# Patient Record
Sex: MALE | Race: WHITE | NOT HISPANIC OR LATINO | Employment: OTHER | URBAN - METROPOLITAN AREA
[De-identification: names, ages, dates, MRNs, and addresses within clinical notes are randomized per-mention and may not be internally consistent; named-entity substitution may affect disease eponyms.]

---

## 2020-07-08 ENCOUNTER — TELEPHONE (OUTPATIENT)
Dept: ENDOCRINOLOGY | Age: 70
End: 2020-07-08

## 2020-07-09 ENCOUNTER — TELEPHONE (OUTPATIENT)
Dept: ENDOCRINOLOGY | Age: 70
End: 2020-07-09

## 2020-07-09 NOTE — TELEPHONE ENCOUNTER
I S/W PT AND GAVE HIM NEW PT ENDO APPT INFO FOR DR STONE. NEW PT APPT CONFIRMATION FAXED TO DR JEFFERSON'S OFFICE [phone].

## 2020-09-29 ENCOUNTER — HOSPITAL ENCOUNTER (OUTPATIENT)
Dept: OTHER | Facility: HOSPITAL | Age: 70
Discharge: HOME | End: 2020-09-29
Attending: FAMILY MEDICINE

## 2020-11-14 ENCOUNTER — HOSPITAL ENCOUNTER (OUTPATIENT)
Dept: PREADMISSION TESTING | Facility: HOSPITAL | Age: 70
Discharge: HOME | End: 2020-11-14
Attending: OPHTHALMOLOGY

## 2020-11-17 LAB — SARS-COV-2 RNA SPEC QL NAA+PROBE: NOT DETECTED

## 2020-11-19 ENCOUNTER — HOSPITAL ENCOUNTER (OUTPATIENT)
Dept: PERIOP | Facility: HOSPITAL | Age: 70
Setting detail: HOSPITAL OUTPATIENT SURGERY
Discharge: HOME | End: 2020-11-19
Attending: OPHTHALMOLOGY

## 2020-11-19 LAB — GLUCOSE BLD-MCNC: 151 MG/DL (ref 70–99)

## 2020-12-03 ENCOUNTER — HOSPITAL ENCOUNTER (OUTPATIENT)
Dept: PERIOP | Facility: HOSPITAL | Age: 70
Setting detail: HOSPITAL OUTPATIENT SURGERY
Discharge: HOME | End: 2020-12-03
Attending: OPHTHALMOLOGY

## 2020-12-03 LAB — GLUCOSE BLD-MCNC: 169 MG/DL (ref 70–99)

## 2021-04-14 ENCOUNTER — OFFICE VISIT CONVERTED (OUTPATIENT)
Dept: CARDIOLOGY | Facility: CLINIC | Age: 71
End: 2021-04-14
Attending: INTERNAL MEDICINE

## 2021-05-10 ENCOUNTER — HOSPITAL ENCOUNTER (OUTPATIENT)
Dept: NUCLEAR MEDICINE | Facility: HOSPITAL | Age: 71
Discharge: HOME | End: 2021-05-10
Attending: NURSE PRACTITIONER

## 2021-05-11 NOTE — H&P
History and Physical      Patient Name: Josue Beck   Patient ID: [id number]   Sex: Male   YOB: 1950    Referring Provider: Nga Gramajo MD    Visit Date: April 14, 2021    Provider: Javier Brito MD   Location: [facility]   Location Address: [address] [place] [address], [address]   HARSHAD Lee  [id number]   Location Phone: [phone]          Chief Complaint     Evaluate after an abnormal EKG.       History Of Present Illness  Consult requested by: Nga Gramajo MD   Josue Beck is a 71 year old /White male who was getting ready to have an eye procedure and they did an EKG, which appeared abnormal, so he went to his doctor for evaluation. They did a repeat EKG and now he comes in for cardiac consult. He denies any chest pain or pressure. No palpitations, shortness of breath, or swelling. He admits his diabetes is uncontrolled and he was given Jardiance and Farxiga and he said he was urinating quite a bit and he passed out, so he is hesitant to do any other kinds of medications for diabetes but he has not had any other episodes since. He denies any PND or orthopnea. He has had both of his COVID vaccines.   PAST MEDICAL HISTORY: Positive for hypertension, he says his blood pressure is starting to creep up; skin cancer; diabetes. Hospitalized for pneumonia in 1997.   FAMILY HISTORY: Positive for diabetes. Negative for high blood pressure and heart disease.   CURRENT MEDICATIONS: Lisinopril 5 mg daily; atorvastatin 40 mg daily; fenofibrate 160 mg daily; metformin 500 mg t.i.d.; esmoprazole 50 mg daily; aspirin 81 mg daily; fexofenadine 180 mg daily.   CHOLESTEROL STATUS: Labs taken recently on atorvastatin in March show total cholesterol is 181, triglycerides 134, HDL 40, .   PSYCHOSOCIAL HISTORY: Denies any mood change or depression. He is . He drinks caffeine daily. Denies alcohol use. Previous use of tobacco, smoked a pack a day for 10 years but  quit 45 years ago. He walks 3 miles most days and has not had any change in his functional capacity.   ALLERGIES: No known drug allergies.       Review of Systems  · Constitutional  o Admits  o : good general health lately  o Denies  o : fatigue, recent weight changes   · Eyes  o Denies  o : double vision  · HENT  o Admits  o : hearing loss or ringing  o Denies  o : chronic sinus problem, swollen glands in neck  · Cardiovascular  o Denies  o : chest pain, palpitations (fast, fluttering, or skipping beats), swelling (feet, ankles, hands), shortness of breath while walking or lying flat  · Respiratory  o Denies  o : shortness of breath, asthma or wheezing, COPD  · Gastrointestinal  o Denies  o : ulcers, nausea or vomiting  · Neurologic  o Denies  o : lightheaded or dizzy, stroke, headaches  · Musculoskeletal  o Denies  o : joint pain, back pain  · Endocrine  o Admits  o : diabetes  o Denies  o : thyroid disease, heat or cold intolerance, excessive thirst or urination  · Heme-Lymph  o Denies  o : bleeding or bruising tendency, anemia      Vitals  Date Time BP Position Site L\R Cuff Size HR RR TEMP (F) WT  HT  BMI kg/m2 BSA m2 O2 Sat FR L/min FiO2 HC       04/14/2021 08:21 /72 Sitting    69 - R   219lbs 0oz 6'   29.7 2.25             Physical Examination  · Constitutional  o Appearance  o : Awake, alert, in no acute distress, accompanied by his wife.   · Eyes  o Conjunctivae  o : Conjunctivae normal.  o Pupils and Irises  o : Grade 1 funduscoipic exam.  · Ears, Nose, Mouth and Throat  o Oral Cavity  o :   § Oral Mucosa  § : Normal oral mucosa.  · Neck  o Inspection/Palpation/Auscultation  o : No lymphadenopathy. No JVD. No bruit. Good carotid upstroke.  · Respiratory  o Respiratory  o : Clear to percussion and auscultation. Good respiratory effort.  · Cardiovascular  o Heart  o : PMI is displaced. S1, S2 regular. No S3. No S4. A very faint murmur at the apex. Negative diastolic murmurs.   o Peripheral Vascular  System  o :   § Femoral Arteries  § : Good femoral pulses.  § Pedal Pulses  § : Good pedal pulses. No pedal edema.  · Gastrointestinal  o Abdominal Examination  o : Soft with some central obesity, no masses or tenderness, no hepatosplenomegaly. Abdominal aorta not palpable.  · Musculoskeletal  o General  o : Muscle strength is normal with normal tone.  · Skin and Subcutaneous Tissue  o General Inspection  o : Within normal limits.  o Digits and Nails  o : Nails are normal.  · EKG  o EKG  o : EKG from PCP on 03/01/2021 indicates sinus rhythm at rate of 64 with right bundle branch block and LAFB, cannot rule out a previous MI, age indeterminate, with no previous EKGs for comparison.   · Labs  o Labs  o : Labs in March show total cholesterol is 181, triglycerides 134, HDL 40, . Hemoglobin A1c was 8.7.           Assessment     1.  Abnormal EKG.  2.  Hypertension, needs tighter control.   3.  Hyperlipidemia, needs tighter control.   4.  Diabetes mellitus, poorly controlled.          Plan     1.  Increase lisinopril to 10 mg once a day for tighter control of the lipids.   2.  Stop atorvastatin.  3.  Start rosuvastatin 40 mg once a day. If lipids are not controlled will consider adding Zetia.   4.  Stop fenofibrate since triglycerides are controlled and if they go uncontrolled we will consider adding        Vascepa, which also has cardiac indications.   5.  Strongly encouraged him to discuss his diabetes control with PCP and try another medication for diabetes        control.   6.  He will do a blood pressure log and will adjust hypertensive medications if needed.   7.  Will do a stress test in view of his abnormal EKG.   8.  Will do an echocardiogram in view of his abnormal EKG.   9.  Will check a lipid panel and CMP in 3 months.   10. Follow up in 6 months, or earlier if needed.     JONATAN Santizo/Javier Brito MD, FACC  JF:PM:rt               Electronically Signed by: Beatriz Mcclendon-, Other  -Author on April 20, 2021 12:05:38 PM  Electronically Co-signed by: JONATAN Hoover -Reviewer on April 20, 2021 12:58:03 PM  Electronically Co-signed by: Javier Brito MD -Reviewer on April 20, 2021 01:08:55 PM

## 2021-05-12 ENCOUNTER — OFFICE VISIT CONVERTED (OUTPATIENT)
Dept: CARDIOLOGY | Facility: CLINIC | Age: 71
End: 2021-05-12
Attending: INTERNAL MEDICINE

## 2021-05-14 VITALS
SYSTOLIC BLOOD PRESSURE: 158 MMHG | HEART RATE: 69 BPM | WEIGHT: 219 LBS | HEIGHT: 72 IN | BODY MASS INDEX: 29.66 KG/M2 | DIASTOLIC BLOOD PRESSURE: 72 MMHG

## 2021-05-25 ENCOUNTER — HOSPITAL ENCOUNTER (OUTPATIENT)
Dept: INFUSION THERAPY | Facility: HOSPITAL | Age: 71
Setting detail: HOSPITAL OUTPATIENT SURGERY
Discharge: HOME | End: 2021-05-25
Attending: INTERNAL MEDICINE

## 2021-05-25 LAB
ALBUMIN SERPL-MCNC: 4.1 G/DL (ref 3.5–5)
ALBUMIN/GLOB SERPL: 1 {RATIO} (ref 1.4–2.6)
ALP SERPL-CCNC: 107 U/L (ref 56–155)
ALT SERPL-CCNC: 98 U/L (ref 10–40)
ANION GAP SERPL CALC-SCNC: 13 MMOL/L (ref 8–19)
APTT BLD: 23.8 S (ref 22.2–34.2)
AST SERPL-CCNC: 73 U/L (ref 15–50)
BASOPHILS # BLD AUTO: 0.06 10*3/UL (ref 0–0.2)
BASOPHILS NFR BLD AUTO: 0.9 % (ref 0–3)
BILIRUB SERPL-MCNC: 0.23 MG/DL (ref 0.2–1.3)
BNP SERPL-MCNC: 68 PG/ML (ref 0–900)
BUN SERPL-MCNC: 32 MG/DL (ref 5–25)
BUN/CREAT SERPL: 33 {RATIO} (ref 6–20)
CALCIUM SERPL-MCNC: 9.7 MG/DL (ref 8.7–10.4)
CHLORIDE SERPL-SCNC: 102 MMOL/L (ref 99–111)
CHOLEST SERPL-MCNC: 143 MG/DL (ref 107–200)
CHOLEST/HDLC SERPL: 3.8 {RATIO} (ref 3–6)
CONV ABS IMM GRAN: 0.01 10*3/UL (ref 0–0.2)
CONV CO2: 27 MMOL/L (ref 22–32)
CONV IMMATURE GRAN: 0.1 % (ref 0–1.8)
CONV TOTAL PROTEIN: 8.1 G/DL (ref 6.3–8.2)
CREAT UR-MCNC: 0.96 MG/DL (ref 0.7–1.2)
DEPRECATED RDW RBC AUTO: 39.8 FL (ref 35.1–43.9)
EOSINOPHIL # BLD AUTO: 0.22 10*3/UL (ref 0–0.7)
EOSINOPHIL # BLD AUTO: 3.2 % (ref 0–7)
ERYTHROCYTE [DISTWIDTH] IN BLOOD BY AUTOMATED COUNT: 12.9 % (ref 11.6–14.4)
GFR SERPLBLD BASED ON 1.73 SQ M-ARVRAT: >60 ML/MIN/{1.73_M2}
GLOBULIN UR ELPH-MCNC: 4 G/DL (ref 2–3.5)
GLUCOSE SERPL-MCNC: 149 MG/DL (ref 70–99)
HCT VFR BLD AUTO: 37.5 % (ref 42–52)
HDLC SERPL-MCNC: 38 MG/DL (ref 40–60)
HGB BLD-MCNC: 12.6 G/DL (ref 14–18)
INR PPP: 0.92 (ref 2–3)
LDLC SERPL CALC-MCNC: 76 MG/DL (ref 70–100)
LYMPHOCYTES # BLD AUTO: 1.99 10*3/UL (ref 1–5)
LYMPHOCYTES NFR BLD AUTO: 28.6 % (ref 20–45)
MCH RBC QN AUTO: 28.8 PG (ref 27–31)
MCHC RBC AUTO-ENTMCNC: 33.6 G/DL (ref 33–37)
MCV RBC AUTO: 85.8 FL (ref 80–96)
MONOCYTES # BLD AUTO: 0.65 10*3/UL (ref 0.2–1.2)
MONOCYTES NFR BLD AUTO: 9.3 % (ref 3–10)
NEUTROPHILS # BLD AUTO: 4.03 10*3/UL (ref 2–8)
NEUTROPHILS NFR BLD AUTO: 57.9 % (ref 30–85)
NRBC CBCN: 0 % (ref 0–0.7)
OSMOLALITY SERPL CALC.SUM OF ELEC: 294 MOSM/KG (ref 273–304)
PLATELET # BLD AUTO: 315 10*3/UL (ref 130–400)
PMV BLD AUTO: 9.4 FL (ref 9.4–12.4)
POTASSIUM SERPL-SCNC: 4.6 MMOL/L (ref 3.5–5.3)
PROTHROMBIN TIME: 10.2 S (ref 9.4–12)
RBC # BLD AUTO: 4.37 10*6/UL (ref 4.7–6.1)
SODIUM SERPL-SCNC: 137 MMOL/L (ref 135–147)
TRIGL SERPL-MCNC: 146 MG/DL (ref 40–150)
VLDLC SERPL-MCNC: 29 MG/DL (ref 5–37)
WBC # BLD AUTO: 6.96 10*3/UL (ref 4.8–10.8)

## 2021-06-05 NOTE — PROCEDURES
"   Procedure Note      Patient Name: Josue Beck   Patient ID: [id number]   Sex: Male   YOB: 1950    Referring Provider: Nga Gramajo MD    Visit Date: May 12, 2021    Provider: Javier Brito MD   Location: [facility]   Location Address: [address] [place] [address], [address]   HARSHAD Lee  [id number]   Location Phone: [phone]          FINAL REPORT   TRANSTHORACIC ECHOCARDIOGRAM REPORT    Diagnosis: Abnormal EKG   Height: 6'0\" Weight: 219 B/P: 158/72 BSA: 2.2   Tech: BNS   MEASUREMENTS:  RVID (Diastole) : RVID. (NORMAL: 0.7 to 2.4 cm max)   LVID (Systole): 2.4 cm (Diastole): 4.2 cm . (NORMAL: 3.7 - 5.4 cm)   Posterior Wall Thickness (Diastole): 1.1 cm. (NORMAL: 0.8 - 1.1 cm)   Septal Thickness (Diastole): 1.1 cm. (NORMAL: 0.7 - 1.2 cm)   LAID (Systole): 3.3 cm. (NORMAL: 1.9 - 3.8 cm)   Aortic Root Diameter (Diastole): 4.2 cm. (NORMAL: 2.0 - 3.7 cm)   COMMENTS:  The patient underwent 2-D, M-Mode, and Doppler examination, including pulse-wave, continuous-wave, and color-flow analysis; the study is technically adequate.   FINDINGS:  MITRAL VALVE: Normal. E to F slope was normal. No evidence of any prolapse.   AORTIC VALVE: Normal with three cusps. Normal central closure. No evidence of any obstruction.   TRICUSPID VALVE: Normal.   PULMONIC VALVE: Normal.   LEFT ATRIUM: Normal; no masses seen. LA volume is 23 mL/m2.   AORTIC ROOT: Normal in size and motion.   LEFT VENTRICLE: The left ventricular chamber size is normal. The left ventricular wall thickness is normal. The left ventricular systolic function is normal with an estimated EF of 60%. No significant regional wall motion abnormalities are identified.   RIGHT ATRIUM: Normal.   RIGHT VENTRICLE: Normal size and function.   PERICARDIUM: No effusion.   INFERIOR VENA CAVA: Diameter is 1.8 cm with greater than 50% reduction with inspiration.   DOPPLER: Pulse-wave, continuous wave, and color-flow Doppler evaluation was performed. E/A ratio " is 0.8. DT= 257 msec. IVRT is 88 msec. E/E' is 9. There is trace mitral and aortic valve regurgitation present. There is tricuspid regurgitation with estimated pulmonary artery systolic pressure of 30-35 mmHg.   Faxed: 05/13/2021      CONCLUSION:  1.  Left ventricular chamber size is normal. The left ventricular wall thickness is normal. The left ventricular        systolic function is normal with an estimated EF of 60%. No significant regional wall motion abnormalities        are identified.   2.  Grade 1 left ventricular diastolic dysfunction.  3.  Trace mitral and aortic valve regurgitation.  4.  Tricuspid regurgitation with estimated pulmonary artery systolic pressure of 30-35 mmHg.      Javier Brito MD, [facility]  PM/pap                   Electronically Signed by: Archana Mcclelland-, Other -Author on May 13, 2021 03:02:17 PM  Electronically Co-signed by: Javier Brito MD -Reviewer on May 14, 2021 12:36:55 PM

## 2021-07-14 ENCOUNTER — LAB (OUTPATIENT)
Dept: LAB | Facility: HOSPITAL | Age: 71
End: 2021-07-14

## 2021-07-14 ENCOUNTER — TRANSCRIBE ORDERS (OUTPATIENT)
Dept: ADMINISTRATIVE | Facility: HOSPITAL | Age: 71
End: 2021-07-14

## 2021-07-14 DIAGNOSIS — Z79.899 ENCOUNTER FOR LONG-TERM (CURRENT) USE OF OTHER MEDICATIONS: ICD-10-CM

## 2021-07-14 DIAGNOSIS — E78.5 HYPERLIPIDEMIA, UNSPECIFIED HYPERLIPIDEMIA TYPE: Primary | ICD-10-CM

## 2021-07-14 DIAGNOSIS — E78.5 HYPERLIPIDEMIA, UNSPECIFIED HYPERLIPIDEMIA TYPE: ICD-10-CM

## 2021-07-14 LAB
ALBUMIN SERPL-MCNC: 4 G/DL (ref 3.5–5.2)
ALBUMIN/GLOB SERPL: 1.3 G/DL
ALP SERPL-CCNC: 85 U/L (ref 39–117)
ALT SERPL W P-5'-P-CCNC: 43 U/L (ref 1–41)
ANION GAP SERPL CALCULATED.3IONS-SCNC: 8.6 MMOL/L (ref 5–15)
AST SERPL-CCNC: 26 U/L (ref 1–40)
BILIRUB SERPL-MCNC: 0.3 MG/DL (ref 0–1.2)
BUN SERPL-MCNC: 27 MG/DL (ref 8–23)
BUN/CREAT SERPL: 28.7 (ref 7–25)
CALCIUM SPEC-SCNC: 9.6 MG/DL (ref 8.6–10.5)
CHLORIDE SERPL-SCNC: 100 MMOL/L (ref 98–107)
CHOLEST SERPL-MCNC: 150 MG/DL (ref 0–200)
CO2 SERPL-SCNC: 26.4 MMOL/L (ref 22–29)
CREAT SERPL-MCNC: 0.94 MG/DL (ref 0.76–1.27)
GFR SERPL CREATININE-BSD FRML MDRD: 79 ML/MIN/1.73
GLOBULIN UR ELPH-MCNC: 3.1 GM/DL
GLUCOSE SERPL-MCNC: 144 MG/DL (ref 65–99)
HDLC SERPL-MCNC: 36 MG/DL (ref 40–60)
LDLC SERPL CALC-MCNC: 87 MG/DL (ref 0–100)
LDLC/HDLC SERPL: 2.33 {RATIO}
POTASSIUM SERPL-SCNC: 4.9 MMOL/L (ref 3.5–5.2)
PROT SERPL-MCNC: 7.1 G/DL (ref 6–8.5)
SODIUM SERPL-SCNC: 135 MMOL/L (ref 136–145)
TRIGL SERPL-MCNC: 151 MG/DL (ref 0–150)
VLDLC SERPL-MCNC: 27 MG/DL (ref 5–40)

## 2021-07-14 PROCEDURE — 80053 COMPREHEN METABOLIC PANEL: CPT

## 2021-07-14 PROCEDURE — 36415 COLL VENOUS BLD VENIPUNCTURE: CPT

## 2021-07-14 PROCEDURE — 80061 LIPID PANEL: CPT

## 2021-07-16 ENCOUNTER — TELEPHONE (OUTPATIENT)
Dept: CARDIOLOGY | Facility: CLINIC | Age: 71
End: 2021-07-16

## 2021-07-16 NOTE — TELEPHONE ENCOUNTER
Spoke with pt and informed him about BT results. Pt verbalized understanding.  Yes pt is out in the sun during morning hours.

## 2021-07-16 NOTE — TELEPHONE ENCOUNTER
----- Message from JONATAN Meyers sent at 7/15/2021  7:44 AM EDT -----  Sodium is slightly low.  Is he spending time will outside in the heat?  Cholesterols are reviewed.  HDL is slightly lower, strongly encourage him to increase his exercise.  LDL is at goal for risk status.  Continue rosuvastatin

## 2021-10-06 ENCOUNTER — OFFICE VISIT (OUTPATIENT)
Dept: CARDIOLOGY | Facility: CLINIC | Age: 71
End: 2021-10-06

## 2021-10-06 VITALS
SYSTOLIC BLOOD PRESSURE: 130 MMHG | WEIGHT: 209 LBS | HEART RATE: 72 BPM | DIASTOLIC BLOOD PRESSURE: 70 MMHG | BODY MASS INDEX: 28.31 KG/M2 | HEIGHT: 72 IN

## 2021-10-06 DIAGNOSIS — I10 HYPERTENSION, ESSENTIAL: Primary | ICD-10-CM

## 2021-10-06 DIAGNOSIS — Z23 NEED FOR IMMUNIZATION AGAINST INFLUENZA: ICD-10-CM

## 2021-10-06 DIAGNOSIS — E78.5 HYPERLIPIDEMIA, UNSPECIFIED HYPERLIPIDEMIA TYPE: ICD-10-CM

## 2021-10-06 DIAGNOSIS — R94.31 EKG, ABNORMAL: ICD-10-CM

## 2021-10-06 PROCEDURE — 99213 OFFICE O/P EST LOW 20 MIN: CPT | Performed by: NURSE PRACTITIONER

## 2021-10-06 RX ORDER — LISINOPRIL 10 MG/1
10 TABLET ORAL DAILY
COMMUNITY
Start: 2021-09-28

## 2021-10-06 RX ORDER — ASPIRIN 81 MG/1
TABLET ORAL
COMMUNITY
End: 2023-01-13 | Stop reason: SDUPTHER

## 2021-10-06 RX ORDER — FEXOFENADINE HCL 180 MG/1
TABLET ORAL
COMMUNITY

## 2021-10-06 RX ORDER — EMPAGLIFLOZIN 10 MG/1
TABLET, FILM COATED ORAL
COMMUNITY
Start: 2021-08-18 | End: 2023-01-13 | Stop reason: SDUPTHER

## 2021-10-06 RX ORDER — ROSUVASTATIN CALCIUM 40 MG/1
TABLET, COATED ORAL
COMMUNITY
Start: 2021-07-21 | End: 2022-04-18

## 2021-10-06 NOTE — ASSESSMENT & PLAN NOTE
Fair control.  We will do blood pressure log and adjust meds if needed.  Continue lisinopril 10 mg once a day

## 2021-10-06 NOTE — PROGRESS NOTES
Chief Complaint  Hypertension (6 months f/u)    Subjective     {Problem List  Visit Diagnosis   Encounters  Notes  Medications  Labs  Result Review Imaging  Media :23}       Josue Beck presents to [facility]  Is a 71-year-old white male with a history of an abnormal EKG.  Stress test was ultimately done and indicated previous MI.  Denies any chest pains, shortness of breath, palpitations, dizziness, syncope, swelling, PND, or orthopnea.  Cardiac wise he has no complaints.  Had both Covid vaccines.  Has not checked his blood pressures lately.              Past History:    History reviewed. No pertinent past medical history.     Family History: family history is not on file.     Social History: reports that he has quit smoking. He has never used smokeless tobacco. He reports previous alcohol use. He reports that he does not use drugs.    Allergies: Ciprofloxacin      History reviewed. No pertinent surgical history.     Prior to Admission medications    Medication Sig Start Date End Date Taking? Authorizing Provider   aspirin (aspirin) 81 MG EC tablet Aspir-81 81 mg oral tablet,delayed release (DR/EC) take 1 tablet (81 mg) by oral route once daily   Active   Yes Radha Hernandez MD   esomeprazole (nexIUM) 20 MG capsule Take 20 mg by mouth Every Morning Before Breakfast.   Yes Radha Hernandez MD   fexofenadine (ALLEGRA) 180 MG tablet    Yes Radha Hernandez MD   Jardiance 10 MG tablet tablet  8/18/21  Yes Radha Hernandez MD   lisinopril (PRINIVIL,ZESTRIL) 10 MG tablet  9/28/21  Yes Radha Hernandez MD   metFORMIN (GLUCOPHAGE) 500 MG tablet 3 (Three) Times a Day As Needed. 9/7/21  Yes Radha Hernandez MD   rosuvastatin (CRESTOR) 40 MG tablet  7/21/21  Yes Radha Hernandez MD        Review of Systems   Respiratory: Negative for shortness of breath.    Cardiovascular: Negative for chest pain, palpitations and leg swelling.   All other systems  "reviewed and are negative.       Objective     Physical Exam  Constitutional:       General: He is not in acute distress.     Appearance: Normal appearance.   Neck:      Vascular: No carotid bruit.   Cardiovascular:      Rate and Rhythm: Normal rate and regular rhythm.      Chest Wall: PMI is displaced.      Heart sounds: Murmur (Faint at the apex) heard.   No S3 or S4 sounds.    Musculoskeletal:      Right lower leg: No edema.      Left lower leg: No edema.   Neurological:      Mental Status: He is alert.       /70 (BP Location: Right arm)   Pulse 72   Ht 182.9 cm (72\")   Wt 94.8 kg (209 lb)   BMI 28.35 kg/m²       Vitals:    10/06/21 1221   BP: 130/70   Pulse: 72       Result Review :         The following data was reviewed by: JONATAN Meyers on 10/06/2021:                      Assessment and Plan        Diagnoses and all orders for this visit:    1. Hypertension, essential (Primary)  Assessment & Plan:  Fair control.  We will do blood pressure log and adjust meds if needed.  Continue lisinopril 10 mg once a day      2. Hyperlipidemia, unspecified hyperlipidemia type  Assessment & Plan:  We are trying to obtain results from PCP.  Continue Crestor 40 mg a day.      3. Need for immunization against influenza  Comments:  Plans to get in the next month PCP office    4. EKG, abnormal  Comments:  And stress test indicated possible MI in the past.  Without any angina.  Continue aspirin 81 once a day            Follow Up     Return in about 9 months (around 7/6/2022) for with Dr. Brito, EKG on next visit.    Patient was given instructions and counseling regarding his condition or for health maintenance advice. Please see specific information pulled into the AVS if appropriate.       JONATAN Santizo  10/06/21 12:22 EDT   "

## 2021-10-28 ENCOUNTER — TELEPHONE (OUTPATIENT)
Dept: CARDIOLOGY | Facility: CLINIC | Age: 71
End: 2021-10-28

## 2022-04-18 RX ORDER — ROSUVASTATIN CALCIUM 40 MG/1
TABLET, COATED ORAL
Qty: 90 TABLET | Refills: 3 | Status: SHIPPED | OUTPATIENT
Start: 2022-04-18 | End: 2023-01-16 | Stop reason: SDUPTHER

## 2022-07-06 ENCOUNTER — OFFICE VISIT (OUTPATIENT)
Dept: CARDIOLOGY | Facility: CLINIC | Age: 72
End: 2022-07-06

## 2022-07-06 VITALS
DIASTOLIC BLOOD PRESSURE: 65 MMHG | HEART RATE: 67 BPM | HEIGHT: 72 IN | SYSTOLIC BLOOD PRESSURE: 139 MMHG | BODY MASS INDEX: 29.12 KG/M2 | WEIGHT: 215 LBS

## 2022-07-06 DIAGNOSIS — I10 HYPERTENSION, ESSENTIAL: ICD-10-CM

## 2022-07-06 DIAGNOSIS — I25.118 CORONARY ARTERY DISEASE OF NATIVE ARTERY OF NATIVE HEART WITH STABLE ANGINA PECTORIS: Primary | ICD-10-CM

## 2022-07-06 DIAGNOSIS — E78.2 MIXED HYPERLIPIDEMIA: ICD-10-CM

## 2022-07-06 PROCEDURE — 99214 OFFICE O/P EST MOD 30 MIN: CPT | Performed by: INTERNAL MEDICINE

## 2022-07-06 RX ORDER — EZETIMIBE 10 MG/1
10 TABLET ORAL DAILY
Qty: 90 TABLET | Refills: 3 | Status: SHIPPED | OUTPATIENT
Start: 2022-07-06 | End: 2023-01-05 | Stop reason: SDUPTHER

## 2022-07-06 RX ORDER — SODIUM PHOSPHATE,MONO-DIBASIC 19G-7G/118
1 ENEMA (ML) RECTAL 2 TIMES DAILY WITH MEALS
COMMUNITY

## 2022-07-06 NOTE — PROGRESS NOTES
Office Visit    Chief Complaint  Hypertension and Hyperlipidemia    Subjective            Josue Beck presents to [facility]  Josue is a 72 years old gentleman with nonobstructive coronary disease hypertension hyperlipidemia who is doing very well.  He denies chest pain shortness of breath palpitations dizziness or syncope.  He is tolerating all his medications well.  His blood pressure is well controlled based on the home blood pressure readings that he has brought for the last 2 weeks      History reviewed. No pertinent past medical history.    Allergies   Allergen Reactions   • Ciprofloxacin Rash        History reviewed. No pertinent surgical history.     Social History     Tobacco Use   • Smoking status: Former Smoker   • Smokeless tobacco: Never Used   Vaping Use   • Vaping Use: Never used   Substance Use Topics   • Alcohol use: Not Currently   • Drug use: Never       History reviewed. No pertinent family history.     Prior to Admission medications    Medication Sig Start Date End Date Taking? Authorizing Provider   aspirin 81 MG EC tablet Aspir-81 81 mg oral tablet,delayed release (DR/EC) take 1 tablet (81 mg) by oral route once daily   Active   Yes Radha Hernandez MD   esomeprazole (nexIUM) 20 MG capsule Take 20 mg by mouth Every Morning Before Breakfast.   Yes ProviderRadha MD   fexofenadine (ALLEGRA) 180 MG tablet    Yes ProviderRadha MD   glucosamine-chondroitin 500-400 MG capsule capsule Take 1 capsule by mouth 2 (Two) Times a Day With Meals.   Yes ProviderRadha MD   Jardiance 10 MG tablet tablet  8/18/21  Yes Radha Hernandez MD   lisinopril (PRINIVIL,ZESTRIL) 10 MG tablet Take 10 mg by mouth Daily. 9/28/21  Yes Radha Hernandez MD   metFORMIN (GLUCOPHAGE) 500 MG tablet 3 (Three) Times a Day As Needed. 9/7/21  Yes Radha Hernandez MD   rosuvastatin (CRESTOR) 40 MG tablet TAKE 1 TABLET AT BEDTIME 4/18/22  Yes Javier Brito MD     "    Review of Systems   Constitutional: Negative for fatigue.   Respiratory: Negative for cough and shortness of breath.    Cardiovascular: Negative for chest pain, palpitations and leg swelling.   Neurological: Negative for dizziness.        Objective     /65   Pulse 67   Ht 182.9 cm (72\")   Wt 97.5 kg (215 lb)   BMI 29.16 kg/m²       Physical Exam  Constitutional:       General: He is awake.      Appearance: Normal appearance.   Neck:      Thyroid: No thyromegaly.      Vascular: No carotid bruit or JVD.   Cardiovascular:      Rate and Rhythm: Normal rate and regular rhythm.      Chest Wall: PMI is not displaced.      Pulses: Normal pulses.      Heart sounds: Normal heart sounds, S1 normal and S2 normal. No murmur heard.    No friction rub. No gallop. No S3 or S4 sounds.   Pulmonary:      Effort: Pulmonary effort is normal.      Breath sounds: Normal breath sounds and air entry. No wheezing, rhonchi or rales.   Abdominal:      General: Bowel sounds are normal.      Palpations: Abdomen is soft. There is no mass.      Tenderness: There is no abdominal tenderness.   Musculoskeletal:      Cervical back: Neck supple.      Right lower leg: No edema.      Left lower leg: No edema.   Neurological:      Mental Status: He is alert and oriented to person, place, and time.   Psychiatric:         Mood and Affect: Mood normal.         Behavior: Behavior is cooperative.       Outside blood work was reviewed.  His LDL is 102.  Chemistry panel and other laboratory parameters are within normal limits    Result Review :                           Assessment and Plan        Diagnoses and all orders for this visit:    1. Coronary artery disease of native artery of native heart with stable angina pectoris (HCC) (Primary)  Assessment & Plan:  He has nonobstructive coronary disease and is doing really well.  Denies chest pain shortness of breath palpitations dizziness or syncope.  He will continue tight control of his blood " pressure and cholesterol.  I am going to add Zetia to his regimen.    Orders:  -     Lipid Panel; Future  -     Comprehensive Metabolic Panel; Future  -     Magnesium; Future    2. Mixed hyperlipidemia  Assessment & Plan:  His LDL is not at goal on max dose of rosuvastatin 40 mg a day.  I will add Zetia 10 mg a day and have him get his blood work done by Dr. Gramajo in 3 months.  Our goal is to get his LDL below 70      3. Hypertension, essential  Assessment & Plan:  His blood pressure is very well regulated and he is brought to be blood pressure log.  He will continue Prinivil 10 mg and will monitor his blood pressure periodically      Other orders  -     ezetimibe (ZETIA) 10 MG tablet; Take 1 tablet by mouth Daily.  Dispense: 90 tablet; Refill: 3          Follow Up     Return in about 6 months (around 1/6/2023) for with chary on jan 5,2023.    Patient was given instructions and counseling regarding his condition or for health maintenance advice. Please see specific information pulled into the AVS if appropriate.     Javier Brito MD  07/06/22 08:43 EDT

## 2022-07-06 NOTE — ASSESSMENT & PLAN NOTE
His LDL is not at goal on max dose of rosuvastatin 40 mg a day.  I will add Zetia 10 mg a day and have him get his blood work done by Dr. Gramajo in 3 months.  Our goal is to get his LDL below 70

## 2022-07-06 NOTE — ASSESSMENT & PLAN NOTE
His blood pressure is very well regulated and he is brought to be blood pressure log.  He will continue Prinivil 10 mg and will monitor his blood pressure periodically

## 2022-07-06 NOTE — ASSESSMENT & PLAN NOTE
He has nonobstructive coronary disease and is doing really well.  Denies chest pain shortness of breath palpitations dizziness or syncope.  He will continue tight control of his blood pressure and cholesterol.  I am going to add Zetia to his regimen.

## 2022-07-18 ENCOUNTER — TELEPHONE (OUTPATIENT)
Dept: CARDIOLOGY | Facility: CLINIC | Age: 72
End: 2022-07-18

## 2022-07-18 NOTE — TELEPHONE ENCOUNTER
Received VM from patient.    Multiple attempts to contact patient. Line states call cannot be completed at this time.

## 2022-07-28 DIAGNOSIS — I25.118 CORONARY ARTERY DISEASE OF NATIVE ARTERY OF NATIVE HEART WITH STABLE ANGINA PECTORIS: ICD-10-CM

## 2022-07-29 ENCOUNTER — TELEPHONE (OUTPATIENT)
Dept: CARDIOLOGY | Facility: CLINIC | Age: 72
End: 2022-07-29

## 2023-01-03 NOTE — PROGRESS NOTES
[facility]  Cardiology progress Note    Patient Name: Josue Beck  : 1950    CHIEF COMPLAINT  Coronary artery disease        Subjective   Subjective     HISTORY OF PRESENT ILLNESS    Josue Beck is a 72 y.o. male with history of coronary disease.  No chest pain or shortness of breath.    REVIEW OF SYSTEMS    Constitutional:    No fever, no weight loss  Skin:     No rash  Otolaryngeal:    No difficulty swallowing  Cardiovascular: See HPI.  Pulmonary:    No cough, no sputum production    Personal History     Social History:    reports that he has quit smoking. He has never used smokeless tobacco. He reports that he does not currently use alcohol. He reports that he does not use drugs.    Home Medications:  Current Outpatient Medications on File Prior to Visit   Medication Sig   • aspirin 81 MG EC tablet Aspir-81 81 mg oral tablet,delayed release (DR/EC) take 1 tablet (81 mg) by oral route once daily   Active   • esomeprazole (nexIUM) 20 MG capsule Take 20 mg by mouth Every Morning Before Breakfast.   • fexofenadine (ALLEGRA) 180 MG tablet    • glucosamine-chondroitin 500-400 MG capsule capsule Take 1 capsule by mouth 2 (Two) Times a Day With Meals.   • Jardiance 10 MG tablet tablet    • lisinopril (PRINIVIL,ZESTRIL) 10 MG tablet Take 10 mg by mouth Daily.   • metFORMIN (GLUCOPHAGE) 500 MG tablet 3 (Three) Times a Day As Needed.   • rosuvastatin (CRESTOR) 40 MG tablet TAKE 1 TABLET AT BEDTIME   • [DISCONTINUED] ezetimibe (ZETIA) 10 MG tablet Take 1 tablet by mouth Daily.     No current facility-administered medications on file prior to visit.       Past Medical History:   Diagnosis Date   • Coronary artery disease    • Hypertension        Allergies:  Allergies   Allergen Reactions   • Ciprofloxacin Rash       Objective    Objective       Vitals:   Heart Rate:  [82] 82  BP: (136)/(78) 136/78  Body mass index is 29.7 kg/m².     PHYSICAL EXAM:    General Appearance:   · well developed  · well  nourished  HENT:   · oropharynx moist  · lips not cyanotic  Neck:  · thyroid not enlarged  · supple  Respiratory:  · no respiratory distress  · normal breath sounds  · no rales  Cardiovascular:  · no jugular venous distention  · regular rhythm  · apical impulse normal  · S1 normal, S2 normal  · no S3, no S4   · no murmur  · no rub, no thrill  · carotid pulses normal; no bruit  · pedal pulses normal  · lower extremity edema: none    Skin:   · warm, dry  Psychiatric:  · judgement and insight appropriate  · normal mood and affect        Result Review:  I have personally reviewed the available results from  [x]  Laboratory  [x]  EKG  [x]  Cardiology  [x]  Medications  [x]  Old records  []  Other:     Procedures  Lab Results   Component Value Date    CHOL 150 07/14/2021     Lab Results   Component Value Date    TRIG 151 (H) 07/14/2021    TRIG 146 05/25/2021     Lab Results   Component Value Date    HDL 36 (L) 07/14/2021    HDL 38 (L) 05/25/2021     Lab Results   Component Value Date    LDL 87 07/14/2021    LDL 76 05/25/2021     Lab Results   Component Value Date    VLDL 27 07/14/2021    VLDL 29 05/25/2021        Impression/Plan:  1.  CORONARY ARTERY DISEASE nonobstructive stable: Continue aspirin 81 mg a day.  2.  Hyperlipidemia: Continue Crestor 40 mg a day.  Continue Zetia 10 mg a day.  Monitor lipid and hepatic profile.  3.  Essential hypertension controlled: Continue Prinivil 10 mg a day.           Paul Young MD   01/05/23   11:54 EST

## 2023-01-05 ENCOUNTER — OFFICE VISIT (OUTPATIENT)
Dept: CARDIOLOGY | Facility: CLINIC | Age: 73
End: 2023-01-05
Payer: MEDICARE

## 2023-01-05 VITALS
BODY MASS INDEX: 29.66 KG/M2 | WEIGHT: 219 LBS | HEART RATE: 82 BPM | SYSTOLIC BLOOD PRESSURE: 136 MMHG | HEIGHT: 72 IN | DIASTOLIC BLOOD PRESSURE: 78 MMHG

## 2023-01-05 DIAGNOSIS — I25.10 CORONARY ARTERY DISEASE INVOLVING NATIVE CORONARY ARTERY OF NATIVE HEART WITHOUT ANGINA PECTORIS: Primary | ICD-10-CM

## 2023-01-05 DIAGNOSIS — I10 HYPERTENSION, ESSENTIAL: ICD-10-CM

## 2023-01-05 DIAGNOSIS — E78.2 HYPERLIPEMIA, MIXED: ICD-10-CM

## 2023-01-05 PROCEDURE — 99214 OFFICE O/P EST MOD 30 MIN: CPT | Performed by: SPECIALIST

## 2023-01-05 RX ORDER — EZETIMIBE 10 MG/1
10 TABLET ORAL DAILY
Qty: 90 TABLET | Refills: 3 | Status: SHIPPED | OUTPATIENT
Start: 2023-01-05 | End: 2023-03-15 | Stop reason: SDUPTHER

## 2023-01-13 RX ORDER — ASPIRIN 81 MG/1
81 TABLET ORAL DAILY
Qty: 90 TABLET | Refills: 2 | Status: SHIPPED | OUTPATIENT
Start: 2023-01-13

## 2023-01-13 RX ORDER — EMPAGLIFLOZIN 10 MG/1
10 TABLET, FILM COATED ORAL DAILY
Qty: 90 TABLET | Refills: 2 | Status: SHIPPED | OUTPATIENT
Start: 2023-01-13

## 2023-01-16 RX ORDER — ROSUVASTATIN CALCIUM 40 MG/1
40 TABLET, COATED ORAL
Qty: 90 TABLET | Refills: 1 | Status: SHIPPED | OUTPATIENT
Start: 2023-01-16 | End: 2023-03-15 | Stop reason: SDUPTHER

## 2023-03-15 RX ORDER — EZETIMIBE 10 MG/1
10 TABLET ORAL DAILY
Qty: 90 TABLET | Refills: 3 | Status: SHIPPED | OUTPATIENT
Start: 2023-03-15

## 2023-03-15 RX ORDER — ROSUVASTATIN CALCIUM 40 MG/1
40 TABLET, COATED ORAL
Qty: 90 TABLET | Refills: 3 | Status: SHIPPED | OUTPATIENT
Start: 2023-03-15

## 2023-04-19 ENCOUNTER — HOSPITAL ENCOUNTER (INPATIENT)
Facility: HOSPITAL | Age: 73
LOS: 3 days | Discharge: HOME | DRG: 244 | End: 2023-04-22
Attending: EMERGENCY MEDICINE | Admitting: STUDENT IN AN ORGANIZED HEALTH CARE EDUCATION/TRAINING PROGRAM
Payer: MEDICARE

## 2023-04-19 ENCOUNTER — APPOINTMENT (OUTPATIENT)
Dept: CARDIOLOGY | Facility: HOSPITAL | Age: 73
DRG: 244 | End: 2023-04-19
Payer: MEDICARE

## 2023-04-19 ENCOUNTER — APPOINTMENT (OUTPATIENT)
Dept: CT IMAGING | Facility: HOSPITAL | Age: 73
DRG: 244 | End: 2023-04-19
Payer: MEDICARE

## 2023-04-19 DIAGNOSIS — R00.1 BRADYCARDIA: Primary | ICD-10-CM

## 2023-04-19 DIAGNOSIS — R55 CARDIAC SYNCOPE: ICD-10-CM

## 2023-04-19 PROBLEM — I44.2 COMPLETE HEART BLOCK: Status: ACTIVE | Noted: 2023-04-19

## 2023-04-19 LAB
ALBUMIN SERPL-MCNC: 4.2 G/DL (ref 3.5–5.2)
ALBUMIN/GLOB SERPL: 1.2 G/DL
ALP SERPL-CCNC: 72 U/L (ref 39–117)
ALT SERPL W P-5'-P-CCNC: 35 U/L (ref 1–41)
ANION GAP SERPL CALCULATED.3IONS-SCNC: 12.3 MMOL/L (ref 5–15)
AST SERPL-CCNC: 32 U/L (ref 1–40)
BASOPHILS # BLD AUTO: 0.05 10*3/MM3 (ref 0–0.2)
BASOPHILS NFR BLD AUTO: 0.5 % (ref 0–1.5)
BILIRUB SERPL-MCNC: 0.3 MG/DL (ref 0–1.2)
BUN SERPL-MCNC: 21 MG/DL (ref 8–23)
BUN/CREAT SERPL: 21.2 (ref 7–25)
CALCIUM SPEC-SCNC: 9.6 MG/DL (ref 8.6–10.5)
CHLORIDE SERPL-SCNC: 101 MMOL/L (ref 98–107)
CO2 SERPL-SCNC: 23.7 MMOL/L (ref 22–29)
CREAT SERPL-MCNC: 0.99 MG/DL (ref 0.76–1.27)
DEPRECATED RDW RBC AUTO: 41.2 FL (ref 37–54)
EGFRCR SERPLBLD CKD-EPI 2021: 80.4 ML/MIN/1.73
EOSINOPHIL # BLD AUTO: 0.07 10*3/MM3 (ref 0–0.4)
EOSINOPHIL NFR BLD AUTO: 0.7 % (ref 0.3–6.2)
ERYTHROCYTE [DISTWIDTH] IN BLOOD BY AUTOMATED COUNT: 13.4 % (ref 12.3–15.4)
GLOBULIN UR ELPH-MCNC: 3.4 GM/DL
GLUCOSE BLDC GLUCOMTR-MCNC: 110 MG/DL (ref 70–99)
GLUCOSE SERPL-MCNC: 182 MG/DL (ref 65–99)
HCT VFR BLD AUTO: 43.1 % (ref 37.5–51)
HGB BLD-MCNC: 14.3 G/DL (ref 13–17.7)
HOLD SPECIMEN: NORMAL
HOLD SPECIMEN: NORMAL
IMM GRANULOCYTES # BLD AUTO: 0.03 10*3/MM3 (ref 0–0.05)
IMM GRANULOCYTES NFR BLD AUTO: 0.3 % (ref 0–0.5)
LYMPHOCYTES # BLD AUTO: 1.35 10*3/MM3 (ref 0.7–3.1)
LYMPHOCYTES NFR BLD AUTO: 13.9 % (ref 19.6–45.3)
MAGNESIUM SERPL-MCNC: 1.6 MG/DL (ref 1.6–2.4)
MCH RBC QN AUTO: 28 PG (ref 26.6–33)
MCHC RBC AUTO-ENTMCNC: 33.2 G/DL (ref 31.5–35.7)
MCV RBC AUTO: 84.5 FL (ref 79–97)
MONOCYTES # BLD AUTO: 0.65 10*3/MM3 (ref 0.1–0.9)
MONOCYTES NFR BLD AUTO: 6.7 % (ref 5–12)
NEUTROPHILS NFR BLD AUTO: 7.53 10*3/MM3 (ref 1.7–7)
NEUTROPHILS NFR BLD AUTO: 77.9 % (ref 42.7–76)
NRBC BLD AUTO-RTO: 0 /100 WBC (ref 0–0.2)
PLATELET # BLD AUTO: 235 10*3/MM3 (ref 140–450)
PMV BLD AUTO: 9.5 FL (ref 6–12)
POTASSIUM SERPL-SCNC: 5 MMOL/L (ref 3.5–5.2)
PROT SERPL-MCNC: 7.6 G/DL (ref 6–8.5)
QT INTERVAL: 387 MS
RBC # BLD AUTO: 5.1 10*6/MM3 (ref 4.14–5.8)
SODIUM SERPL-SCNC: 137 MMOL/L (ref 136–145)
TROPONIN T SERPL HS-MCNC: 18 NG/L
TSH SERPL DL<=0.05 MIU/L-ACNC: 2.39 UIU/ML (ref 0.27–4.2)
WBC NRBC COR # BLD: 9.68 10*3/MM3 (ref 3.4–10.8)
WHOLE BLOOD HOLD COAG: NORMAL
WHOLE BLOOD HOLD SPECIMEN: NORMAL

## 2023-04-19 PROCEDURE — 84484 ASSAY OF TROPONIN QUANT: CPT

## 2023-04-19 PROCEDURE — 25010000002 MAGNESIUM SULFATE 2 GM/50ML SOLUTION: Performed by: STUDENT IN AN ORGANIZED HEALTH CARE EDUCATION/TRAINING PROGRAM

## 2023-04-19 PROCEDURE — 99285 EMERGENCY DEPT VISIT HI MDM: CPT

## 2023-04-19 PROCEDURE — 85025 COMPLETE CBC W/AUTO DIFF WBC: CPT

## 2023-04-19 PROCEDURE — 83735 ASSAY OF MAGNESIUM: CPT

## 2023-04-19 PROCEDURE — 93306 TTE W/DOPPLER COMPLETE: CPT | Performed by: INTERNAL MEDICINE

## 2023-04-19 PROCEDURE — 93005 ELECTROCARDIOGRAM TRACING: CPT

## 2023-04-19 PROCEDURE — 80053 COMPREHEN METABOLIC PANEL: CPT

## 2023-04-19 PROCEDURE — 82962 GLUCOSE BLOOD TEST: CPT

## 2023-04-19 PROCEDURE — 33210 INSERT ELECTRD/PM CATH SNGL: CPT | Performed by: INTERNAL MEDICINE

## 2023-04-19 PROCEDURE — 94799 UNLISTED PULMONARY SVC/PX: CPT

## 2023-04-19 PROCEDURE — 93306 TTE W/DOPPLER COMPLETE: CPT

## 2023-04-19 PROCEDURE — 84443 ASSAY THYROID STIM HORMONE: CPT | Performed by: STUDENT IN AN ORGANIZED HEALTH CARE EDUCATION/TRAINING PROGRAM

## 2023-04-19 PROCEDURE — 93005 ELECTROCARDIOGRAM TRACING: CPT | Performed by: EMERGENCY MEDICINE

## 2023-04-19 PROCEDURE — 99223 1ST HOSP IP/OBS HIGH 75: CPT | Performed by: STUDENT IN AN ORGANIZED HEALTH CARE EDUCATION/TRAINING PROGRAM

## 2023-04-19 PROCEDURE — 99222 1ST HOSP IP/OBS MODERATE 55: CPT | Performed by: INTERNAL MEDICINE

## 2023-04-19 PROCEDURE — 25010000002 MIDAZOLAM PER 1 MG: Performed by: INTERNAL MEDICINE

## 2023-04-19 PROCEDURE — C1894 INTRO/SHEATH, NON-LASER: HCPCS | Performed by: INTERNAL MEDICINE

## 2023-04-19 PROCEDURE — 70450 CT HEAD/BRAIN W/O DYE: CPT

## 2023-04-19 PROCEDURE — 25010000002 FENTANYL CITRATE (PF) 100 MCG/2ML SOLUTION: Performed by: INTERNAL MEDICINE

## 2023-04-19 RX ORDER — SODIUM CHLORIDE 0.9 % (FLUSH) 0.9 %
10 SYRINGE (ML) INJECTION AS NEEDED
Status: DISCONTINUED | OUTPATIENT
Start: 2023-04-19 | End: 2023-04-22 | Stop reason: HOSPADM

## 2023-04-19 RX ORDER — SODIUM CHLORIDE 0.9 % (FLUSH) 0.9 %
10 SYRINGE (ML) INJECTION EVERY 12 HOURS SCHEDULED
Status: DISCONTINUED | OUTPATIENT
Start: 2023-04-19 | End: 2023-04-22 | Stop reason: HOSPADM

## 2023-04-19 RX ORDER — ACETAMINOPHEN 325 MG/1
650 TABLET ORAL EVERY 6 HOURS PRN
Status: DISCONTINUED | OUTPATIENT
Start: 2023-04-19 | End: 2023-04-20 | Stop reason: SDUPTHER

## 2023-04-19 RX ORDER — MIDAZOLAM HYDROCHLORIDE 1 MG/ML
INJECTION INTRAMUSCULAR; INTRAVENOUS
Status: DISCONTINUED | OUTPATIENT
Start: 2023-04-19 | End: 2023-04-19 | Stop reason: HOSPADM

## 2023-04-19 RX ORDER — SODIUM CHLORIDE 9 MG/ML
100 INJECTION, SOLUTION INTRAVENOUS CONTINUOUS
Status: DISCONTINUED | OUTPATIENT
Start: 2023-04-19 | End: 2023-04-21

## 2023-04-19 RX ORDER — MAGNESIUM SULFATE HEPTAHYDRATE 40 MG/ML
2 INJECTION, SOLUTION INTRAVENOUS ONCE
Status: COMPLETED | OUTPATIENT
Start: 2023-04-19 | End: 2023-04-19

## 2023-04-19 RX ORDER — ROSUVASTATIN CALCIUM 20 MG/1
40 TABLET, COATED ORAL NIGHTLY
Status: CANCELLED | OUTPATIENT
Start: 2023-04-19

## 2023-04-19 RX ORDER — CETIRIZINE HYDROCHLORIDE 10 MG/1
10 TABLET ORAL DAILY
Status: CANCELLED | OUTPATIENT
Start: 2023-04-20

## 2023-04-19 RX ORDER — CHOLECALCIFEROL (VITAMIN D3) 125 MCG
5 CAPSULE ORAL NIGHTLY PRN
Status: DISCONTINUED | OUTPATIENT
Start: 2023-04-19 | End: 2023-04-22 | Stop reason: HOSPADM

## 2023-04-19 RX ORDER — LISINOPRIL 10 MG/1
10 TABLET ORAL DAILY
Status: CANCELLED | OUTPATIENT
Start: 2023-04-20

## 2023-04-19 RX ORDER — DEXTROSE MONOHYDRATE 25 G/50ML
25 INJECTION, SOLUTION INTRAVENOUS
Status: CANCELLED | OUTPATIENT
Start: 2023-04-19

## 2023-04-19 RX ORDER — NICOTINE POLACRILEX 4 MG
15 LOZENGE BUCCAL
Status: CANCELLED | OUTPATIENT
Start: 2023-04-19

## 2023-04-19 RX ORDER — ROSUVASTATIN CALCIUM 20 MG/1
40 TABLET, COATED ORAL NIGHTLY
Status: DISCONTINUED | OUTPATIENT
Start: 2023-04-19 | End: 2023-04-22 | Stop reason: HOSPADM

## 2023-04-19 RX ORDER — SODIUM CHLORIDE 9 MG/ML
40 INJECTION, SOLUTION INTRAVENOUS AS NEEDED
Status: DISCONTINUED | OUTPATIENT
Start: 2023-04-19 | End: 2023-04-22 | Stop reason: HOSPADM

## 2023-04-19 RX ORDER — PANTOPRAZOLE SODIUM 40 MG/1
40 TABLET, DELAYED RELEASE ORAL
Status: CANCELLED | OUTPATIENT
Start: 2023-04-20

## 2023-04-19 RX ORDER — INSULIN LISPRO 100 [IU]/ML
2-7 INJECTION, SOLUTION INTRAVENOUS; SUBCUTANEOUS
Status: CANCELLED | OUTPATIENT
Start: 2023-04-20

## 2023-04-19 RX ORDER — FENTANYL CITRATE 50 UG/ML
INJECTION, SOLUTION INTRAMUSCULAR; INTRAVENOUS
Status: DISCONTINUED | OUTPATIENT
Start: 2023-04-19 | End: 2023-04-19 | Stop reason: HOSPADM

## 2023-04-19 RX ORDER — ALUMINA, MAGNESIA, AND SIMETHICONE 2400; 2400; 240 MG/30ML; MG/30ML; MG/30ML
15 SUSPENSION ORAL EVERY 6 HOURS PRN
Status: DISCONTINUED | OUTPATIENT
Start: 2023-04-19 | End: 2023-04-22 | Stop reason: HOSPADM

## 2023-04-19 RX ORDER — NITROGLYCERIN 0.4 MG/1
0.4 TABLET SUBLINGUAL
Status: DISCONTINUED | OUTPATIENT
Start: 2023-04-19 | End: 2023-04-22 | Stop reason: HOSPADM

## 2023-04-19 RX ORDER — LIDOCAINE HYDROCHLORIDE 20 MG/ML
INJECTION, SOLUTION INFILTRATION; PERINEURAL
Status: DISCONTINUED | OUTPATIENT
Start: 2023-04-19 | End: 2023-04-19 | Stop reason: HOSPADM

## 2023-04-19 RX ADMIN — ROSUVASTATIN 40 MG: 20 TABLET, FILM COATED ORAL at 21:33

## 2023-04-19 RX ADMIN — MAGNESIUM SULFATE HEPTAHYDRATE 2 G: 40 INJECTION, SOLUTION INTRAVENOUS at 19:05

## 2023-04-19 RX ADMIN — SODIUM CHLORIDE 100 ML/HR: 9 INJECTION, SOLUTION INTRAVENOUS at 19:05

## 2023-04-19 RX ADMIN — Medication 5 MG: at 21:34

## 2023-04-19 RX ADMIN — Medication 10 ML: at 21:34

## 2023-04-19 NOTE — ED PROVIDER NOTES
Time: 2:40 PM EDT  Date of encounter:  4/19/2023  Independent Historian/Clinical History and Information was obtained by:   Patient and Family  Chief Complaint   Patient presents with   • Syncope   • Dizziness       History is limited by: N/A    History of Present Illness:  Patient is a 73 y.o. year old male who presents to the emergency department for evaluation of syncope.  Patient has had 3 syncopal episodes today.  The patient had just finished mann hunting.  The patient noted some acute onset of shortness of breath when going up the stairs.  That resolved with rest.  The patient went into take a shower and felt lightheaded.    Patient denies any chest pain, chest pressure or palpitations.  The patient notes the first 2 syncopal episodes occurred while he was in the shower.  The patient rested for an hour.  The patient attempted to get up and then had his third syncopal episode.  Family voices concern because when EMS arrived they stated that the patient was bradycardic.  Patient does state he has had 1 syncopal episode before but he was never evaluated after that.  Patient feels back at baseline now.  The patient does not have cancer that he is aware of.  The patient has had no previous history of DVT or pulmonary embolism.  The patient's had no recent surgery.  The patient's had no recent long travel or immobilization.  The patient has no unilateral leg pain or leg swelling.  The patient is treated for hypertension.  The patient has had a heart catheterization in the past which demonstrated only 25% occlusion.     HPI    Patient Care Team  Primary Care Provider: Pasha Gramajo MD    Past Medical History:     Allergies   Allergen Reactions   • Ciprofloxacin Rash     Past Medical History:   Diagnosis Date   • Coronary artery disease    • Hypertension      History reviewed. No pertinent surgical history.  History reviewed. No pertinent family history.    Home Medications:  Prior to Admission medications     Medication Sig Start Date End Date Taking? Authorizing Provider   aspirin 81 MG EC tablet Take 1 tablet by mouth Daily. 1/13/23  Yes Paul Young MD   esomeprazole (nexIUM) 20 MG capsule Take 1 capsule by mouth Every Morning Before Breakfast.   Yes Radha Hernandez MD   fexofenadine (ALLEGRA) 180 MG tablet    Yes Radha Hernandez MD   glucosamine-chondroitin 500-400 MG capsule capsule Take 1 capsule by mouth 2 (Two) Times a Day With Meals.   Yes Radha Hernandez MD   Jardiance 10 MG tablet tablet Take 1 tablet by mouth Daily. 1/13/23  Yes Paul Young MD   lisinopril (PRINIVIL,ZESTRIL) 10 MG tablet Take 1 tablet by mouth Daily. 9/28/21  Yes Radha Hernandez MD   metFORMIN (GLUCOPHAGE) 500 MG tablet 3 (Three) Times a Day As Needed. 9/7/21  Yes Radha Hernandez MD   rosuvastatin (CRESTOR) 40 MG tablet Take 1 tablet by mouth every night at bedtime. 3/15/23  Yes Paul Young MD   ezetimibe (ZETIA) 10 MG tablet Take 1 tablet by mouth Daily. 3/15/23 4/19/23  Paul Young MD        Social History:   Social History     Tobacco Use   • Smoking status: Former   • Smokeless tobacco: Never   Vaping Use   • Vaping Use: Never used   Substance Use Topics   • Alcohol use: Not Currently   • Drug use: Never         Review of Systems:  Review of Systems   Constitutional: Negative for chills, diaphoresis and fever.   HENT: Negative for congestion, postnasal drip, rhinorrhea and sore throat.    Eyes: Negative for photophobia.   Respiratory: Positive for shortness of breath. Negative for cough and chest tightness.    Cardiovascular: Negative for chest pain, palpitations and leg swelling.   Gastrointestinal: Negative for abdominal pain, diarrhea, nausea and vomiting.   Genitourinary: Negative for difficulty urinating, dysuria, flank pain, frequency, hematuria and urgency.   Musculoskeletal: Negative for neck pain and neck stiffness.   Skin: Negative for pallor and rash.  "  Neurological: Positive for syncope. Negative for dizziness, weakness, numbness and headaches.   Hematological: Negative for adenopathy. Does not bruise/bleed easily.   Psychiatric/Behavioral: Negative.         Physical Exam:  /65   Pulse 78   Temp 98.1 °F (36.7 °C) (Oral)   Resp 16   Ht 182.9 cm (72\")   Wt 101 kg (221 lb 12.5 oz)   SpO2 98%   BMI 30.08 kg/m²     Physical Exam  Vitals and nursing note reviewed.   Constitutional:       General: He is not in acute distress.     Appearance: Normal appearance. He is not ill-appearing, toxic-appearing or diaphoretic.   HENT:      Head: Normocephalic and atraumatic.      Mouth/Throat:      Mouth: Mucous membranes are moist.   Eyes:      Extraocular Movements: Extraocular movements intact.      Conjunctiva/sclera: Conjunctivae normal.      Pupils: Pupils are equal, round, and reactive to light.   Cardiovascular:      Rate and Rhythm: Normal rate and regular rhythm.      Pulses: Normal pulses.           Carotid pulses are 2+ on the right side and 2+ on the left side.       Radial pulses are 2+ on the right side and 2+ on the left side.        Femoral pulses are 2+ on the right side and 2+ on the left side.       Popliteal pulses are 2+ on the right side and 2+ on the left side.        Dorsalis pedis pulses are 2+ on the right side and 2+ on the left side.        Posterior tibial pulses are 2+ on the right side and 2+ on the left side.      Heart sounds: Normal heart sounds. No murmur heard.  Pulmonary:      Effort: Pulmonary effort is normal. No accessory muscle usage, respiratory distress or retractions.      Breath sounds: Normal breath sounds. No wheezing, rhonchi or rales.   Abdominal:      General: Abdomen is flat. There is no distension.      Palpations: Abdomen is soft. There is no mass or pulsatile mass.      Tenderness: There is no abdominal tenderness. There is no right CVA tenderness, left CVA tenderness, guarding or rebound.      Comments: No " rigidity   Musculoskeletal:         General: No swelling, tenderness or deformity.      Cervical back: Neck supple. No tenderness.      Right lower leg: No edema.      Left lower leg: No edema.   Skin:     General: Skin is warm and dry.      Capillary Refill: Capillary refill takes less than 2 seconds.      Coloration: Skin is not cyanotic, jaundiced or pale.      Findings: No erythema.   Neurological:      General: No focal deficit present.      Mental Status: He is alert and oriented to person, place, and time. Mental status is at baseline.      Cranial Nerves: Cranial nerves 2-12 are intact. No cranial nerve deficit.      Sensory: Sensation is intact. No sensory deficit.      Motor: Motor function is intact. No weakness or pronator drift.      Coordination: Coordination is intact. Coordination normal.   Psychiatric:         Attention and Perception: Attention and perception normal.         Mood and Affect: Mood normal.         Behavior: Behavior normal.                  Procedures:  Procedures      Medical Decision Making:      Comorbidities that affect care:    Hypertension    External Notes reviewed:    None      The following orders were placed and all results were independently analyzed by me:  Orders Placed This Encounter   Procedures   • CT Head Without Contrast   • [place] Draw   • Comprehensive Metabolic Panel   • Magnesium   • Single High Sensitivity Troponin T   • CBC Auto Differential   • NPO Diet NPO Type: Strict NPO   • Undress & Gown   • Cardiac Monitoring   • Continuous Pulse Oximetry   • Vital Signs   • Orthostatic Blood Pressure   • Code Status and Medical Interventions:   • General MD Inpatient Consult   • Inpatient Hospitalist Consult   • Oxygen Therapy- Nasal Cannula; 2 LPM; Titrate for SPO2: equal to or greater than, 92%   • POC Glucose Once   • EP/CRM Study   • ECG 12 Lead ED Triage Standing Order; Syncope   • Insert Peripheral IV   • Inpatient Admission   • CBC & Differential   • Green Top  (Gel)   • Lavender Top   • Gold Top - SST   • Light Blue Top       Medications Given in the Emergency Department:  Medications   sodium chloride 0.9 % flush 10 mL ( Intravenous MAR Hold 4/19/23 6772)        ED Course:    The patient was initially evaluated in the triage area where orders were placed. The patient was later dispositioned by Mihir Bledsoe DO.      The patient was advised to stay for completion of workup which includes but is not limited to communication of labs and radiological results, reassessment and plan. The patient was advised that leaving prior to disposition by a provider could result in critical findings that are not communicated to the patient.     ED Course as of 04/19/23 1835   Wed Apr 19, 2023   1417 EKG:    Rhythm: Sinus rhythm  Rate: 87  Intervals: Normal GA and QT interval  Right bundle branch block and left anterior fascicular block  T-wave: T wave inversion in aVL  ST Segment: Nonspecific ST segment aVL, II, III, aVF, no obvious pathological ST elevation or reciprocal ST depression to suggest acute myocardial infarction    QT interval 387  QRS axis is -48  QRS duration is 141    EKG Comparison: No EKG available for comparison    Interpreted by me   [SD]   1440 PROVIDER IN TRIAGE  Patient was evaluated by me in triage, Byron Pittman PA-C.  Orders were placed and patient is currently awaiting final results and disposition.  [MD]      ED Course User Index  [MD] Byron Pittman PA-C  [SD] Mihir Bledsoe DO       Labs:    Lab Results (last 24 hours)     Procedure Component Value Units Date/Time    CBC & Differential [[id number]]  (Abnormal) Collected: 04/19/23 1428    Specimen: Blood Updated: 04/19/23 1434    Narrative:      The following orders were created for panel order CBC & Differential.  Procedure                               Abnormality         Status                     ---------                               -----------         ------                     CBC Auto  Differential[[id number]]        Abnormal            Final result                 Please view results for these tests on the individual orders.    Comprehensive Metabolic Panel [[id number]]  (Abnormal) Collected: 04/19/23 1428    Specimen: Blood Updated: 04/19/23 1452     Glucose 182 mg/dL      BUN 21 mg/dL      Creatinine 0.99 mg/dL      Sodium 137 mmol/L      Potassium 5.0 mmol/L      Comment: Slight hemolysis detected by analyzer. Results may be affected.        Chloride 101 mmol/L      CO2 23.7 mmol/L      Calcium 9.6 mg/dL      Total Protein 7.6 g/dL      Albumin 4.2 g/dL      ALT (SGPT) 35 U/L      AST (SGOT) 32 U/L      Alkaline Phosphatase 72 U/L      Total Bilirubin 0.3 mg/dL      Globulin 3.4 gm/dL      A/G Ratio 1.2 g/dL      BUN/Creatinine Ratio 21.2     Anion Gap 12.3 mmol/L      eGFR 80.4 mL/min/1.73     Narrative:      GFR Normal >60  Chronic Kidney Disease <60  Kidney Failure <15    The GFR formula is only valid for adults with stable renal function between ages 18 and 70.    Magnesium [[id number]]  (Normal) Collected: 04/19/23 1428    Specimen: Blood Updated: 04/19/23 1452     Magnesium 1.6 mg/dL     Single High Sensitivity Troponin T [[id number]]  (Abnormal) Collected: 04/19/23 1428    Specimen: Blood Updated: 04/19/23 1452     HS Troponin T 18 ng/L     Narrative:      High Sensitive Troponin T Reference Range:  <10.0 ng/L- Negative Female for AMI  <15.0 ng/L- Negative Male for AMI  >=10 - Abnormal Female indicating possible myocardial injury.  >=15 - Abnormal Male indicating possible myocardial injury.   Clinicians would have to utilize clinical acumen, EKG, Troponin, and serial changes to determine if it is an Acute Myocardial Infarction or myocardial injury due to an underlying chronic condition.         CBC Auto Differential [[id number]]  (Abnormal) Collected: 04/19/23 1428    Specimen: Blood Updated: 04/19/23 1434     WBC 9.68 10*3/mm3      RBC 5.10 10*6/mm3      Hemoglobin 14.3 g/dL      Hematocrit  43.1 %      MCV 84.5 fL      MCH 28.0 pg      MCHC 33.2 g/dL      RDW 13.4 %      RDW-SD 41.2 fl      MPV 9.5 fL      Platelets 235 10*3/mm3      Neutrophil % 77.9 %      Lymphocyte % 13.9 %      Monocyte % 6.7 %      Eosinophil % 0.7 %      Basophil % 0.5 %      Immature Grans % 0.3 %      Neutrophils, Absolute 7.53 10*3/mm3      Lymphocytes, Absolute 1.35 10*3/mm3      Monocytes, Absolute 0.65 10*3/mm3      Eosinophils, Absolute 0.07 10*3/mm3      Basophils, Absolute 0.05 10*3/mm3      Immature Grans, Absolute 0.03 10*3/mm3      nRBC 0.0 /100 WBC            Imaging:    CT Head Without Contrast    Result Date: 4/19/2023  PROCEDURE: CT HEAD WO CONTRAST  COMPARISON:  [facility], CT, HEAD W/O CONTRAST, 9/29/2020, 13:18. INDICATIONS: syncope, dizziness  PROTOCOL:   Standard imaging protocol performed    RADIATION:   DLP: 756 mGy*cm   MA and/or KV was adjusted to minimize radiation dose.     TECHNIQUE: After obtaining the patient's consent, CT images were obtained without non-ionic intravenous contrast material.  FINDINGS:  No focal brain lesion, mass or intracranial hemorrhage is seen.  The ventricles are normal in size and configuration.  The visualized extracranial soft tissues appear normal.  No calvarial abnormality is seen.  Visualized paranasal sinuses are clear.  Mastoid air cells are clear.  Debris in the bilateral external auditory canals is favored to represent cerumen.        1. No acute intracranial abnormality     Joshua Dawn M.D.       Electronically Signed and Approved By: Joshua Dawn M.D. on 4/19/2023 at 16:23                 Differential Diagnosis and Discussion:      Syncope: Differential diagnosis includes but is not limited to TIA, hyperventilation, aortic stenosis, pulmonary emboli, myocardial disease, bradycardia arrhythmia, heart block, tachyarrhythmia, vasovagal, orthostatic hypotension, ruptured AAA, aortic dissection, subarachnoid hemorrhage, seizure,  hypoglycemia.    All labs were reviewed and interpreted by me.    MDM  Number of Diagnoses or Management Options  Cardiac syncope  Diagnosis management comments: Patient's vital signs are stable in the emergency room    Patient's head CT demonstrated no acute abnormalities    The patient's CBC was reviewed and shows no abnormalities of critical concern.  Of note, there is no anemia requiring a blood transfusion and the platelet count is acceptable    The patient's CMP was reviewed and shows no abnormalities of critical concern.  Of note, the patient's sodium and potassium are acceptable.  The patient's liver enzymes are unremarkable.  The patient's renal function including creatinine is preserved.  The patient has a normal anion gap.    Patient had normal electrolytes.    Patient's high-sensitivity troponin was 18    Patient's EKG demonstrated a normal sinus rhythm with a rate of 87.  There is nonspecific ST abnormalities.  The patient had no evidence of acute myocardial infarction              I have reviewed a rhythm strip the EMS performed.  The patient has a heart rate of 25 on the rhythm strip.  And appears to be a complete heart block.  The patient has been in sinus rhythm in the emergency room.    Finnish Syncope Risk Score - MDCalc  Calculated on Apr 19 2023 5:01 PM  4 points -> Finnish Syncope Risk Score  High  risk -> 12.9% risk of 30-day serious adverse event (death, arrhythmia, MI - full list in Evidence)     Pacer pads were placed externally on the patient in preparation and awaiting the Cath Lab       Amount and/or Complexity of Data Reviewed  Clinical lab tests: reviewed  Tests in the medicine section of CPT®: reviewed  Discuss the patient with other providers: yes (5:00 PM: I reviewed the rhythm strip from EMS with Dr. Anderson.  He agrees that the rhythm strip most likely represents third-degree heart block..  He expresses concern that the patient had 3 syncopal episodes and more than likely will  have another episode of this in the middle the night.  He feels that the patient warrants a temporary pacemaker at this time.  He is going to take the patient to the Cath Lab now.    17:15 EDT  I discussed the case with the hospitalist.  We have discussed the patient's presenting symptoms, laboratory values, imaging and condition at the time of admission.  They will evaluate the patient in the emergency room and admit the patient to the hospital)    Critical Care  Total time providing critical care: 35 minutes (Total critical care time of.  Total critical care time documented does not include time spent on separately billed procedures for services of nurses or physician assistants.  I personally saw and examined the patient.  I have reviewed all diagnostic interpretations and treatment plans as written.  I was present for the key portions of any procedures performed and the inclusive time noted in any critical care statement.  Critical care time includes patient management by me, time spent at the patient bedside, time to review labs/ ABG's, imaging results, discussing patient care, documentation in the medical record and time spent with family or caregiver)     Social Determinants of Health:    Patient is independent, reliable, and has access to care.       Disposition and Care Coordination:    Admit:   Through independent evaluation of the patient's history, physical, and imperical data, the patient meets criteria for observation/admission to the hospital.        Final diagnoses:   Cardiac syncope        ED Disposition     ED Disposition   Decision to Admit    Condition   --    Comment   Level of Care: Critical Care [6]   Diagnosis: Bradycardia [[id number]]   Admitting Physician: TORI WISDOM [[id number]]   Attending Physician: TORI WISDOM [[id number]]   Certification: I Certify That Inpatient Hospital Services Are Medically Necessary For Greater Than 2 Midnights               This medical record created using voice  recognition software.           Mihir Bledsoe DO  04/19/23 8806

## 2023-04-19 NOTE — Clinical Note
Suture was used to secure the sheath post procedure. Transparent Dressing was used to secure the sheath post procedure.

## 2023-04-19 NOTE — H&P
[facility]   HOSPITALIST HISTORY AND PHYSICAL  Date: 2023   Patient Name: Josue Beck  : 1950  MRN: [id number]  Primary Care Physician:  Pasha Gramajo MD  Date of admission: 2023    Subjective   Subjective     Chief Complaint: Passing out    HPI:    Josue Beck is a 73 y.o. male past medical history of nonobstructive CAD, hypertension, hyperlipidemia who presents to the ER due to 5 episodes of syncope.  Patient states he was doing some activity outside earlier today and returned home.  He had to walk up a flight of steps upon arrival to his house.  While he got to the top of the steps he felt short of breath but he went to go take a shower anyways.  While in the shower he had 3 separate syncopal episodes 2 of which occurred while he was sitting down.  He then was able to ambulate to his bed where he laid down for an hour and did not have any recurrence of syncope.  He then walked over to his couch and was able to sit on the couch but notified his granddaughter who is a nurse who advised him to come to the ER.  They called EMS and just prior to EMSs arrival he had 2 further episodes of syncope while sitting down.  When they checked his vital signs they noted his heart rate to be anywhere from the 30s to the 40s.  A rhythm strip obtained by EMS showed he had a complete heart block that was transient in nature.  He was subsequently brought to the ER.  Upon arrival here he was stable with sinus bradycardia on telemetry.  Lab work-up was essentially unremarkable other than a mild troponin leak.  Cardiology was consulted by the ER physician who advised admission to the ICU with plans for transvenous pacer to be placed urgently.  Hospitalist service was then contacted for admission      Personal History     Past Medical History:  Past Medical History:   Diagnosis Date   • Coronary artery disease    • Hypertension          Past Surgical History:  History reviewed. No pertinent surgical  history.      Family History:   Reviewed and noncontributory except as mentioned in HPI    Social History:   Social Determinants of Health     Tobacco Use: Medium Risk   • Smoking Tobacco Use: Former   • Smokeless Tobacco Use: Never   • Passive Exposure: Not on file   Alcohol Use: Not on file   Financial Resource Strain: Not on file   Food Insecurity: Not on file   Transportation Needs: Not on file   Physical Activity: Not on file   Stress: Not on file   Social Connections: Not on file   Intimate Partner Violence: Not on file   Depression: Not on file   Housing Stability: Not on file         Home Medications:  aspirin, empagliflozin, esomeprazole, fexofenadine, glucosamine-chondroitin, lisinopril, metFORMIN, and rosuvastatin    Allergies:  Allergies   Allergen Reactions   • Ciprofloxacin Rash       Review of Systems   All systems were reviewed and negative except for: Syncope    Objective   Objective     Vitals:   Temp:  [98.1 °F (36.7 °C)] 98.1 °F (36.7 °C)  Heart Rate:  [78-87] 78  Resp:  [16] 16  BP: (121-144)/(65-76) 121/65    Physical Exam    Constitutional: Awake, alert, no acute distress   Eyes: Pupils equal, sclerae anicteric, no conjunctival injection   HENT: NCAT, mucous membranes moist   Neck: Supple, no thyromegaly, no lymphadenopathy, trachea midline   Respiratory: Clear to auscultation bilaterally, nonlabored respirations    Cardiovascular: RRR, no murmurs, rubs, or gallops, palpable pedal pulses bilaterally   Gastrointestinal: Positive bowel sounds, soft, nontender, nondistended   Musculoskeletal: No bilateral ankle edema, no clubbing or cyanosis to extremities   Psychiatric: Appropriate affect, cooperative   Neurologic: Oriented x 3, strength symmetric in all extremities, Cranial Nerves grossly intact to confrontation, speech clear   Skin: No rashes     Result Review    Result Review:  I have personally reviewed the results from the time of this admission to 4/19/2023 17:44 EDT and agree with these  findings:  [x]  Laboratory  [x]  Microbiology  [x]  Radiology  [x]  EKG/Telemetry   [x]  Cardiology/Vascular   []  Pathology  [x]  Old records  []  Other:      Assessment & Plan   Assessment / Plan     Assessment/Plan:   • Syncope secondary to transient complete heart block  • Exertional dyspnea  • Hypertension  • Hyperlipidemia  • Nonobstructive coronary artery disease    Plan  - Admit to hospitalist service  - EKG personally reviewed does show a prolonged [place] interval with a sinus rhythm.  No ischemic changes noted.  Rhythm strip reviewed and does show clear signs of a transient complete heart block, scanned into patient's electronic record.  All labs personally reviewed and does not show evidence of a secondary cause of his transient complete heart block  - We will place in CCU, cardiology has been consulted and is planning urgent intervention, pulmonary also to be consulted  - Transcutaneous pacer to remain on until transvenous pacing is obtained, check magnesium level, TSH  - Check echocardiogram given complaints of exertional dyspnea to rule out a cardiomyopathy  - Clarify other home meds and resume as appropriate      Discussed with ER Physician and Nurse      DVT Prophylaxis: SCDs    CODE STATUS:    Code Status (Patient has no pulse and is not breathing): CPR (Attempt to Resuscitate)  Medical Interventions (Patient has pulse or is breathing): Full Support      Admission Status:  I believe this patient meets inpatient status.    Electronically signed by Larry Nunez MD, 04/19/23, 5:44 PM EDT.

## 2023-04-19 NOTE — Clinical Note
Level of Care: Telemetry [5]   Diagnosis: Bradycardia [[id number]]   Admitting Physician: TORI WISDOM [[id number]]   Attending Physician: TORI WISDOM [[id number]]   Certification: I Certify That Inpatient Hospital Services Are Medically Necessary For Greater Than 2 Midnights

## 2023-04-19 NOTE — Clinical Note
A 4 fr sheath was  inserted using micropuncture technique into the left subclavian vein. Mini stick max.

## 2023-04-20 ENCOUNTER — APPOINTMENT (OUTPATIENT)
Dept: GENERAL RADIOLOGY | Facility: HOSPITAL | Age: 73
DRG: 244 | End: 2023-04-20
Payer: MEDICARE

## 2023-04-20 LAB
ALBUMIN SERPL-MCNC: 3.5 G/DL (ref 3.5–5.2)
ALBUMIN/GLOB SERPL: 1.3 G/DL
ALP SERPL-CCNC: 64 U/L (ref 39–117)
ALT SERPL W P-5'-P-CCNC: 25 U/L (ref 1–41)
ANION GAP SERPL CALCULATED.3IONS-SCNC: 8.6 MMOL/L (ref 5–15)
ASCENDING AORTA: 3.6 CM
AST SERPL-CCNC: 19 U/L (ref 1–40)
BASOPHILS # BLD AUTO: 0.05 10*3/MM3 (ref 0–0.2)
BASOPHILS NFR BLD AUTO: 0.6 % (ref 0–1.5)
BH CV ECHO MEAS - AO ROOT DIAM: 4.2 CM
BH CV ECHO MEAS - EDV(MOD-SP2): 84 ML
BH CV ECHO MEAS - EDV(MOD-SP4): 70 ML
BH CV ECHO MEAS - EF(MOD-BP): 53 %
BH CV ECHO MEAS - ESV(MOD-SP2): 42 ML
BH CV ECHO MEAS - ESV(MOD-SP4): 31 ML
BH CV ECHO MEAS - IVSD: 0.9 CM
BH CV ECHO MEAS - LA DIMENSION: 2.9 CM
BH CV ECHO MEAS - LAT PEAK E' VEL: 7.4 CM/SEC
BH CV ECHO MEAS - LVIDD: 4.9 CM
BH CV ECHO MEAS - LVIDS: 2.2 CM
BH CV ECHO MEAS - LVOT DIAM: 2.5 CM
BH CV ECHO MEAS - LVPWD: 0.9 CM
BH CV ECHO MEAS - MED PEAK E' VEL: 6.6 CM/SEC
BH CV ECHO MEAS - MV A MAX VEL: 96 CM/SEC
BH CV ECHO MEAS - MV DEC TIME: 285 MSEC
BH CV ECHO MEAS - MV E MAX VEL: 63 CM/SEC
BH CV ECHO MEAS - MV E/A: 0.6
BH CV ECHO MEAS - RAP SYSTOLE: 3 MMHG
BH CV ECHO MEAS - RVSP: 32 MMHG
BH CV ECHO MEAS - TR MAX PG: 29 MMHG
BH CV ECHO MEAS - TR MAX VEL: 270 CM/SEC
BH CV ECHO MEASUREMENTS AVERAGE E/E' RATIO: 9
BILIRUB SERPL-MCNC: 0.2 MG/DL (ref 0–1.2)
BUN SERPL-MCNC: 22 MG/DL (ref 8–23)
BUN/CREAT SERPL: 23.4 (ref 7–25)
CALCIUM SPEC-SCNC: 8.7 MG/DL (ref 8.6–10.5)
CHLORIDE SERPL-SCNC: 105 MMOL/L (ref 98–107)
CO2 SERPL-SCNC: 23.4 MMOL/L (ref 22–29)
CREAT SERPL-MCNC: 0.94 MG/DL (ref 0.76–1.27)
DEPRECATED RDW RBC AUTO: 41.5 FL (ref 37–54)
EGFRCR SERPLBLD CKD-EPI 2021: 85.6 ML/MIN/1.73
EOSINOPHIL # BLD AUTO: 0.18 10*3/MM3 (ref 0–0.4)
EOSINOPHIL NFR BLD AUTO: 2.3 % (ref 0.3–6.2)
ERYTHROCYTE [DISTWIDTH] IN BLOOD BY AUTOMATED COUNT: 13.4 % (ref 12.3–15.4)
GLOBULIN UR ELPH-MCNC: 2.7 GM/DL
GLUCOSE BLDC GLUCOMTR-MCNC: 111 MG/DL (ref 70–99)
GLUCOSE BLDC GLUCOMTR-MCNC: 139 MG/DL (ref 70–99)
GLUCOSE BLDC GLUCOMTR-MCNC: 169 MG/DL (ref 70–99)
GLUCOSE BLDC GLUCOMTR-MCNC: 184 MG/DL (ref 70–99)
GLUCOSE SERPL-MCNC: 213 MG/DL (ref 65–99)
HCT VFR BLD AUTO: 37.3 % (ref 37.5–51)
HGB BLD-MCNC: 12.8 G/DL (ref 13–17.7)
IMM GRANULOCYTES # BLD AUTO: 0.02 10*3/MM3 (ref 0–0.05)
IMM GRANULOCYTES NFR BLD AUTO: 0.3 % (ref 0–0.5)
IVRT: 95 MSEC
LEFT ATRIUM VOLUME INDEX: 22 ML/M2
LYMPHOCYTES # BLD AUTO: 2.28 10*3/MM3 (ref 0.7–3.1)
LYMPHOCYTES NFR BLD AUTO: 28.9 % (ref 19.6–45.3)
MAGNESIUM SERPL-MCNC: 2 MG/DL (ref 1.6–2.4)
MAXIMAL PREDICTED HEART RATE: 147 BPM
MCH RBC QN AUTO: 29 PG (ref 26.6–33)
MCHC RBC AUTO-ENTMCNC: 34.3 G/DL (ref 31.5–35.7)
MCV RBC AUTO: 84.6 FL (ref 79–97)
MONOCYTES # BLD AUTO: 0.89 10*3/MM3 (ref 0.1–0.9)
MONOCYTES NFR BLD AUTO: 11.3 % (ref 5–12)
NEUTROPHILS NFR BLD AUTO: 4.46 10*3/MM3 (ref 1.7–7)
NEUTROPHILS NFR BLD AUTO: 56.6 % (ref 42.7–76)
NRBC BLD AUTO-RTO: 0 /100 WBC (ref 0–0.2)
PHOSPHATE SERPL-MCNC: 3.6 MG/DL (ref 2.5–4.5)
PLATELET # BLD AUTO: 214 10*3/MM3 (ref 140–450)
PMV BLD AUTO: 9.4 FL (ref 6–12)
POTASSIUM SERPL-SCNC: 4.1 MMOL/L (ref 3.5–5.2)
PROT SERPL-MCNC: 6.2 G/DL (ref 6–8.5)
RBC # BLD AUTO: 4.41 10*6/MM3 (ref 4.14–5.8)
SODIUM SERPL-SCNC: 137 MMOL/L (ref 136–145)
STRESS TARGET HR: 125 BPM
WBC NRBC COR # BLD: 7.88 10*3/MM3 (ref 3.4–10.8)

## 2023-04-20 PROCEDURE — C1892 INTRO/SHEATH,FIXED,PEEL-AWAY: HCPCS | Performed by: INTERNAL MEDICINE

## 2023-04-20 PROCEDURE — 0JH606Z INSERTION OF PACEMAKER, DUAL CHAMBER INTO CHEST SUBCUTANEOUS TISSUE AND FASCIA, OPEN APPROACH: ICD-10-PCS | Performed by: INTERNAL MEDICINE

## 2023-04-20 PROCEDURE — 02HK3JZ INSERTION OF PACEMAKER LEAD INTO RIGHT VENTRICLE, PERCUTANEOUS APPROACH: ICD-10-PCS | Performed by: INTERNAL MEDICINE

## 2023-04-20 PROCEDURE — C1898 LEAD, PMKR, OTHER THAN TRANS: HCPCS | Performed by: INTERNAL MEDICINE

## 2023-04-20 PROCEDURE — 80053 COMPREHEN METABOLIC PANEL: CPT | Performed by: STUDENT IN AN ORGANIZED HEALTH CARE EDUCATION/TRAINING PROGRAM

## 2023-04-20 PROCEDURE — 94799 UNLISTED PULMONARY SVC/PX: CPT

## 2023-04-20 PROCEDURE — C1894 INTRO/SHEATH, NON-LASER: HCPCS | Performed by: INTERNAL MEDICINE

## 2023-04-20 PROCEDURE — 71045 X-RAY EXAM CHEST 1 VIEW: CPT

## 2023-04-20 PROCEDURE — 25510000001 IOPAMIDOL PER 1 ML: Performed by: INTERNAL MEDICINE

## 2023-04-20 PROCEDURE — 85025 COMPLETE CBC W/AUTO DIFF WBC: CPT | Performed by: STUDENT IN AN ORGANIZED HEALTH CARE EDUCATION/TRAINING PROGRAM

## 2023-04-20 PROCEDURE — 82962 GLUCOSE BLOOD TEST: CPT

## 2023-04-20 PROCEDURE — 25010000002 FENTANYL CITRATE (PF) 50 MCG/ML SOLUTION: Performed by: INTERNAL MEDICINE

## 2023-04-20 PROCEDURE — 84100 ASSAY OF PHOSPHORUS: CPT | Performed by: STUDENT IN AN ORGANIZED HEALTH CARE EDUCATION/TRAINING PROGRAM

## 2023-04-20 PROCEDURE — 99233 SBSQ HOSP IP/OBS HIGH 50: CPT | Performed by: INTERNAL MEDICINE

## 2023-04-20 PROCEDURE — 25010000002 CEFAZOLIN IN DEXTROSE 2-4 GM/100ML-% SOLUTION: Performed by: INTERNAL MEDICINE

## 2023-04-20 PROCEDURE — 33208 INSRT HEART PM ATRIAL & VENT: CPT | Performed by: INTERNAL MEDICINE

## 2023-04-20 PROCEDURE — 25010000002 MORPHINE PER 10 MG: Performed by: INTERNAL MEDICINE

## 2023-04-20 PROCEDURE — C1785 PMKR, DUAL, RATE-RESP: HCPCS | Performed by: INTERNAL MEDICINE

## 2023-04-20 PROCEDURE — 25010000002 MAGNESIUM SULFATE 2 GM/50ML SOLUTION: Performed by: INTERNAL MEDICINE

## 2023-04-20 PROCEDURE — 5A1213Z PERFORMANCE OF CARDIAC PACING, INTERMITTENT: ICD-10-PCS | Performed by: INTERNAL MEDICINE

## 2023-04-20 PROCEDURE — 94761 N-INVAS EAR/PLS OXIMETRY MLT: CPT

## 2023-04-20 PROCEDURE — 25010000002 MIDAZOLAM PER 1 MG: Performed by: INTERNAL MEDICINE

## 2023-04-20 PROCEDURE — 99232 SBSQ HOSP IP/OBS MODERATE 35: CPT | Performed by: INTERNAL MEDICINE

## 2023-04-20 PROCEDURE — 99223 1ST HOSP IP/OBS HIGH 75: CPT | Performed by: INTERNAL MEDICINE

## 2023-04-20 PROCEDURE — 83735 ASSAY OF MAGNESIUM: CPT | Performed by: STUDENT IN AN ORGANIZED HEALTH CARE EDUCATION/TRAINING PROGRAM

## 2023-04-20 PROCEDURE — 02H63JZ INSERTION OF PACEMAKER LEAD INTO RIGHT ATRIUM, PERCUTANEOUS APPROACH: ICD-10-PCS | Performed by: INTERNAL MEDICINE

## 2023-04-20 DEVICE — PACE/SENSE LEAD
Type: IMPLANTABLE DEVICE | Status: FUNCTIONAL
Brand: INGEVITY™+

## 2023-04-20 DEVICE — PACEMAKER
Type: IMPLANTABLE DEVICE | Status: FUNCTIONAL
Brand: ACCOLADE™ MRI DR

## 2023-04-20 RX ORDER — SODIUM CHLORIDE 9 MG/ML
40 INJECTION, SOLUTION INTRAVENOUS AS NEEDED
Status: DISCONTINUED | OUTPATIENT
Start: 2023-04-20 | End: 2023-04-22 | Stop reason: HOSPADM

## 2023-04-20 RX ORDER — ACETAMINOPHEN 325 MG/1
650 TABLET ORAL EVERY 4 HOURS PRN
Status: DISCONTINUED | OUTPATIENT
Start: 2023-04-20 | End: 2023-04-22 | Stop reason: HOSPADM

## 2023-04-20 RX ORDER — INSULIN LISPRO 100 [IU]/ML
2-7 INJECTION, SOLUTION INTRAVENOUS; SUBCUTANEOUS
Status: DISCONTINUED | OUTPATIENT
Start: 2023-04-20 | End: 2023-04-22 | Stop reason: HOSPADM

## 2023-04-20 RX ORDER — MORPHINE SULFATE 2 MG/ML
2 INJECTION, SOLUTION INTRAMUSCULAR; INTRAVENOUS EVERY 4 HOURS PRN
Status: DISCONTINUED | OUTPATIENT
Start: 2023-04-20 | End: 2023-04-22 | Stop reason: HOSPADM

## 2023-04-20 RX ORDER — CEFAZOLIN SODIUM 2 G/100ML
2 INJECTION, SOLUTION INTRAVENOUS ONCE
Status: COMPLETED | OUTPATIENT
Start: 2023-04-20 | End: 2023-04-20

## 2023-04-20 RX ORDER — CEFAZOLIN SODIUM 2 G/100ML
2 INJECTION, SOLUTION INTRAVENOUS EVERY 8 HOURS
Status: DISCONTINUED | OUTPATIENT
Start: 2023-04-20 | End: 2023-04-21

## 2023-04-20 RX ORDER — DEXTROSE MONOHYDRATE 25 G/50ML
25 INJECTION, SOLUTION INTRAVENOUS
Status: DISCONTINUED | OUTPATIENT
Start: 2023-04-20 | End: 2023-04-20

## 2023-04-20 RX ORDER — LIDOCAINE HYDROCHLORIDE 20 MG/ML
INJECTION, SOLUTION INFILTRATION; PERINEURAL
Status: DISCONTINUED | OUTPATIENT
Start: 2023-04-20 | End: 2023-04-20 | Stop reason: HOSPADM

## 2023-04-20 RX ORDER — SODIUM CHLORIDE 0.9 % (FLUSH) 0.9 %
1-10 SYRINGE (ML) INJECTION AS NEEDED
Status: DISCONTINUED | OUTPATIENT
Start: 2023-04-20 | End: 2023-04-22 | Stop reason: HOSPADM

## 2023-04-20 RX ORDER — ACETAMINOPHEN 650 MG/1
650 SUPPOSITORY RECTAL EVERY 4 HOURS PRN
Status: DISCONTINUED | OUTPATIENT
Start: 2023-04-20 | End: 2023-04-22 | Stop reason: HOSPADM

## 2023-04-20 RX ORDER — BUPIVACAINE HYDROCHLORIDE 5 MG/ML
INJECTION, SOLUTION EPIDURAL; INTRACAUDAL
Status: DISCONTINUED | OUTPATIENT
Start: 2023-04-20 | End: 2023-04-20 | Stop reason: HOSPADM

## 2023-04-20 RX ORDER — DEXTROSE MONOHYDRATE 25 G/50ML
25 INJECTION, SOLUTION INTRAVENOUS
Status: DISCONTINUED | OUTPATIENT
Start: 2023-04-20 | End: 2023-04-22 | Stop reason: HOSPADM

## 2023-04-20 RX ORDER — CEFAZOLIN SODIUM 2 G/100ML
2 INJECTION, SOLUTION INTRAVENOUS EVERY 8 HOURS
Status: DISCONTINUED | OUTPATIENT
Start: 2023-04-20 | End: 2023-04-20 | Stop reason: SDUPTHER

## 2023-04-20 RX ORDER — SODIUM CHLORIDE 0.9 % (FLUSH) 0.9 %
3 SYRINGE (ML) INJECTION EVERY 12 HOURS SCHEDULED
Status: DISCONTINUED | OUTPATIENT
Start: 2023-04-20 | End: 2023-04-22 | Stop reason: HOSPADM

## 2023-04-20 RX ORDER — MIDAZOLAM HYDROCHLORIDE 1 MG/ML
INJECTION INTRAMUSCULAR; INTRAVENOUS
Status: DISCONTINUED | OUTPATIENT
Start: 2023-04-20 | End: 2023-04-20 | Stop reason: HOSPADM

## 2023-04-20 RX ORDER — FENTANYL CITRATE 50 UG/ML
INJECTION, SOLUTION INTRAMUSCULAR; INTRAVENOUS
Status: DISCONTINUED | OUTPATIENT
Start: 2023-04-20 | End: 2023-04-20 | Stop reason: HOSPADM

## 2023-04-20 RX ORDER — NICOTINE POLACRILEX 4 MG
15 LOZENGE BUCCAL
Status: DISCONTINUED | OUTPATIENT
Start: 2023-04-20 | End: 2023-04-20

## 2023-04-20 RX ORDER — ONDANSETRON 2 MG/ML
4 INJECTION INTRAMUSCULAR; INTRAVENOUS EVERY 6 HOURS PRN
Status: DISCONTINUED | OUTPATIENT
Start: 2023-04-20 | End: 2023-04-22 | Stop reason: HOSPADM

## 2023-04-20 RX ORDER — TEMAZEPAM 15 MG/1
15 CAPSULE ORAL NIGHTLY PRN
Status: DISCONTINUED | OUTPATIENT
Start: 2023-04-20 | End: 2023-04-22 | Stop reason: HOSPADM

## 2023-04-20 RX ORDER — MAGNESIUM SULFATE HEPTAHYDRATE 40 MG/ML
2 INJECTION, SOLUTION INTRAVENOUS ONCE
Status: COMPLETED | OUTPATIENT
Start: 2023-04-20 | End: 2023-04-20

## 2023-04-20 RX ORDER — INSULIN LISPRO 100 [IU]/ML
2-7 INJECTION, SOLUTION INTRAVENOUS; SUBCUTANEOUS
Status: DISCONTINUED | OUTPATIENT
Start: 2023-04-20 | End: 2023-04-20

## 2023-04-20 RX ORDER — NICOTINE POLACRILEX 4 MG
15 LOZENGE BUCCAL
Status: DISCONTINUED | OUTPATIENT
Start: 2023-04-20 | End: 2023-04-22 | Stop reason: HOSPADM

## 2023-04-20 RX ADMIN — MORPHINE SULFATE 2 MG: 2 INJECTION, SOLUTION INTRAMUSCULAR; INTRAVENOUS at 08:50

## 2023-04-20 RX ADMIN — MAGNESIUM SULFATE HEPTAHYDRATE 2 G: 2 INJECTION, SOLUTION INTRAVENOUS at 18:50

## 2023-04-20 RX ADMIN — CEFAZOLIN SODIUM 2 G: 2 INJECTION, SOLUTION INTRAVENOUS at 22:16

## 2023-04-20 RX ADMIN — ACETAMINOPHEN 650 MG: 325 TABLET ORAL at 09:24

## 2023-04-20 RX ADMIN — Medication 10 ML: at 09:24

## 2023-04-20 RX ADMIN — ROSUVASTATIN 40 MG: 20 TABLET, FILM COATED ORAL at 20:09

## 2023-04-20 RX ADMIN — CEFAZOLIN SODIUM 2 G: 2 INJECTION, SOLUTION INTRAVENOUS at 14:30

## 2023-04-20 RX ADMIN — SODIUM CHLORIDE 100 ML/HR: 9 INJECTION, SOLUTION INTRAVENOUS at 18:49

## 2023-04-20 RX ADMIN — ACETAMINOPHEN 650 MG: 325 TABLET ORAL at 17:42

## 2023-04-20 NOTE — PROGRESS NOTES
[facility]   Cardiology Progress Note      Patient Name: Josue Beck  : 1950  MRN: [id number]  Primary Care Physician:  Pasha Gramajo MD  Referring Physician: No ref. provider found  Date of admission: 2023    Subjective   Subjective     Chief Complaint: Syncope    HPI:  Josue Beck is a 73 y.o. male with history of coronary disease admitted with syncopal and presyncopal spell and complete heart block intermittently    REVIEW OF SYSTEMS    Constitutional:    No fever, no weight loss  Skin:     No rash  Otolaryngeal:    No difficulty swallowing  Cardiovascular:  No chest pain or shortness of breath  Pulmonary:    No cough, no sputum production    Objective    Objective     Vitals:   Vitals:    23 0500 23 0600 23 0700 23 0703   BP: 122/69 155/97 126/67    BP Location:       Patient Position:       Pulse: 63 67 64    Resp:       Temp:    97.7 °F (36.5 °C)   TempSrc:    Oral   SpO2: 96% 95% 96%    Weight:       Height:                Physical Exam:   Constitutional: Awake, alert, No acute distress    Eyes: PERRLA, sclerae anicteric, no conjunctival injection   HENT: NCAT, mucous membranes moist   Neck: Supple, no thyromegaly, no lymphadenopathy, trachea midline   Respiratory: Clear to auscultation bilaterally, nonlabored respirations    Cardiovascular: RRR, no murmurs, rubs, or gallops, palpable pedal pulses bilaterally   Gastrointestinal: Positive bowel sounds, soft, nontender, nondistended   Musculoskeletal: No bilateral ankle edema, no clubbing or cyanosis to extremities   Psychiatric: Appropriate affect, cooperative   Neurologic: Oriented x 3, strength symmetric in all extremities, Cranial Nerves grossly intact to confrontation, speech clear   Skin: No rashes.      Current medications:  insulin lispro, 2-7 Units, Subcutaneous, TID With Meals  rosuvastatin, 40 mg, Oral, Nightly  sodium chloride, 10 mL, Intravenous, Q12H      Current IV drips:  sodium chloride, 100  mL/hr, Last Rate: 100 mL/hr (04/19/23 1905)        Result Review    Result Review:  I have personally reviewed the results from the time of this admission to 4/20/2023 09:31 EDT and agree with these findings:  []  Laboratory  []  EKG/Telemetry   []  Cardiology/Vascular   []  Radiology         CBC        4/19/2023    14:28 4/20/2023    03:02   CBC   WBC 9.68   7.88     RBC 5.10   4.41     Hemoglobin 14.3   12.8     Hematocrit 43.1   37.3     MCV 84.5   84.6     MCH 28.0   29.0     MCHC 33.2   34.3     RDW 13.4   13.4     Platelets 235   214       CMP        4/19/2023    14:28 4/20/2023    03:02   CMP   Glucose 182   213     BUN 21   22     Creatinine 0.99   0.94     EGFR 80.4   85.6     Sodium 137   137     Potassium 5.0   4.1     Chloride 101   105     Calcium 9.6   8.7     Total Protein 7.6   6.2     Albumin 4.2   3.5     Globulin 3.4   2.7     Total Bilirubin 0.3   0.2     Alkaline Phosphatase 72   64     AST (SGOT) 32   19     ALT (SGPT) 35   25     Albumin/Globulin Ratio 1.2   1.3     BUN/Creatinine Ratio 21.2   23.4     Anion Gap 12.3   8.6       Results for orders placed during the hospital encounter of 04/19/23    Adult Transthoracic Echo Complete W/ Cont if Necessary Per Protocol    Interpretation Summary  •  Left ventricular ejection fraction appears to be 56 - 60%.  •  Left ventricular diastolic function is consistent with (grade I) impaired relaxation and age.  •  Estimated right ventricular systolic pressure from tricuspid regurgitation is normal (<35 mmHg).  •  Mild dilation of the aortic root is present at 4.2 cm.  •  No significant valvular disease.        No results found for: PROBNP      Telemetry reviewed     Assessment / Plan     ASSESSMENT:    Complete heart block    Bradycardia        PLAN:  1.  Permanent pacemaker implantation today.  Risk benefits discussed with the patient  2.  Restart home medicines postprocedure.  Electronically signed by Paul Young MD, 04/20/23, 9:31 AM EDT.

## 2023-04-20 NOTE — CONSULTS
Pulmonary / Critical Care Consult Note      Patient Name: Josue Beck  : 1950  MRN: [id number]  Primary Care Physician:  Pasha Gramajo MD  Referring Physician: No ref. provider found  Date of admission: 2023    Subjective   Subjective     Reason for Consult/ Chief Complaint: syncope with complete heart block    HPI:  Josue Beck is a 73 y.o. male who presented to the emergency room after patient was with episodes of syncope at the house.  Per patient he had been outside looking for morels, started feeling lightheaded return to the house.  He had initially tried to walk up a flight of steps but when he got to the top of steps he felt short of breath and he went to go take a shower.  He had 5  syncopal episodes while in house.  Patient contacted his granddaughter who encouraged him to go to the ER for further evaluation.  patient called EMS, when EMS arrived they noticed heart rate to be between 30s and 40s.  Rhythm strip obtained by EMS with findings of complete heart block.  Upon arrival to the emergency room cardiology was consulted and transvenous pacer placed.  Patient was admitted to CCU for further evaluation and treatment.       Review of Systems  Constitutional symptoms:  Denied complaints   Ear, nose, throat: Denied complaints  Cardiovascular:  Denied complaints  Respiratory: Denied complaints  Gastrointestinal: Denied complaints  Musculoskeletal: Denied complaints  Genitourinary: Denied complaints  Allergy / Immunology: Denied complaints  Hematologic: Denied complaints  Neurologic: Lightheadedness and dizziness, has resolved; otherwise denied complaints  Skin: Denied complaints  Endocrine: Denied complaints  Psychiatric: Denied complaints      Personal History     Past Medical History:   Diagnosis Date   • Coronary artery disease    • Hypertension        History reviewed. No pertinent surgical history.    Family History: family history is not on file. Otherwise pertinent FHx was reviewed  and not pertinent to current issue.    Social History:  reports that he has quit smoking. He has never used smokeless tobacco. He reports that he does not currently use alcohol. He reports that he does not use drugs.    Home Medications:  aspirin, empagliflozin, esomeprazole, fexofenadine, glucosamine-chondroitin, lisinopril, metFORMIN, and rosuvastatin    Allergies:  Allergies   Allergen Reactions   • Ciprofloxacin Rash       Objective    Objective     Vitals:   Temp:  [97.7 °F (36.5 °C)-98.7 °F (37.1 °C)] 98.1 °F (36.7 °C)  Heart Rate:  [60-87] 65  Resp:  [15-17] 15  BP: (116-155)/() 145/67  Flow (L/min):  [2] 2    Physical Exam:  Vital Signs Reviewed   WDWN, Alert, NAD.    HEENT:  PERRL, EOMI.  OP, nares clear, MMM  Neck:  Supple, no JVD, no thyromegaly  Lymph: no axillary, cervical, supraclavicular lymphadenopathy noted bilaterally  Chest:  good aeration, clear to auscultation bilaterally, tympanic to percussion bilaterally, no work of breathing noted  CV: Sinus bradycardia in 60s, occasionally with pacer spikes, no MGR, pulses 2+, equal.  Abd:  Soft, NT, ND, + BS, no HSM  EXT:  no clubbing, no cyanosis, no edema, no joint tenderness  Neuro:  A&Ox3, CN grossly intact, no focal deficits.  Skin: No rashes or lesions noted  Result Review    Result Review:  I have personally reviewed the results from the time of this admission to 4/20/2023 14:00 EDT and agree with these findings:  []  Laboratory  []  Microbiology  []  Radiology  []  EKG/Telemetry   []  Cardiology/Vascular   []  Pathology  []  Old records   []  Other:  Most notable findings include: \      Lab 04/20/23  0302 04/19/23  1428   WBC 7.88 9.68   HEMOGLOBIN 12.8* 14.3   HEMATOCRIT 37.3* 43.1   PLATELETS 214 235   SODIUM 137 137   POTASSIUM 4.1 5.0   CHLORIDE 105 101   CO2 23.4 23.7   BUN 22 21   CREATININE 0.94 0.99   GLUCOSE 213* 182*   CALCIUM 8.7 9.6   PHOSPHORUS 3.6  --    TOTAL PROTEIN 6.2 7.6   ALBUMIN 3.5 4.2   GLOBULIN 2.7 3.4     CARDIAC  LABS:      Lab 04/19/23  1428   HSTROP T 18*         Assessment & Plan   Assessment / Plan     Active Hospital Problems:  Active Hospital Problems    Diagnosis    • **Complete heart block    • Bradycardia      Impression:  Complete heart block status post transvenous pacer placement  Syncopal episode secondary to above  CAD  HTN  Acute complaint of back pain since laying still post transvenous pacer placement    Plan:    Cardiology on board.  Plan is for pacemaker placement today.  On NS IVF at 100  Morphine 2 mg ordered as needed for back pain  Echocardiogram ordered  Trend renal function electrolytes.  Place as needed  I personally reviewed all imaging, laboratory data, and I spoke with respiratory therapy, and nursing regarding the patient's care, have also spoken with the patient's primary admitting physician regarding his plan of care.        Electronically signed by AUGUSTA Armenta, 04/20/23, 2:09 PM EDT.  I, Dr. Ferdinand Martin, have spent more than 51% of the total time managing the patient in this encounter today.  This included personally reviewing all pertinent labs, imaging, microbiology and documentation. Also discussing the case with the patient and any available family, the admitting physician and any available ancillary staff    Electronically signed by Ferdinand Martin DO, 04/21/23, 2:09 PM EDT.

## 2023-04-20 NOTE — CONSULTS
[facility]   Cardiology Consult Note    Patient Name: Josue Beck  : 1950  MRN: [id number]  Primary Care Physician:  Pasha Gramajo MD  Referring Physician: No ref. provider found  Date of admission: 2023    Subjective   Subjective     Reason for Consult/ Chief Complaint: Multiple presyncopal and syncopal episodes.    HPI:  Josue Beck is a 73 y.o. male with history of mild coronary artery disease and hypertension who presented with multiple presyncopal and syncopal episodes.  He states he had 4-5 today.  The ambulance was called and when he was on his way in they had a strip showing third-degree heart block with a ventricular escape rhythm at 25 bpm.  He was not on any rate controlling blood pressure medications.    Review of Systems   All systems were reviewed and negative except for: Presyncope/syncope    Personal History     Past Medical History:   Diagnosis Date   • Coronary artery disease    • Hypertension         Past medical history reviewed and includes hyperlipidemia and diabetes.      Family History: family history is not on file. Otherwise pertinent FHx was reviewed and not pertinent to current issue.    Social History:  reports that he has quit smoking. He has never used smokeless tobacco. He reports that he does not currently use alcohol. He reports that he does not use drugs.    Home Medications:  aspirin, empagliflozin, esomeprazole, fexofenadine, glucosamine-chondroitin, lisinopril, metFORMIN, and rosuvastatin    Allergies:  Allergies   Allergen Reactions   • Ciprofloxacin Rash       Objective    Objective     Vitals:   Temp:  [98.1 °F (36.7 °C)-98.3 °F (36.8 °C)] 98.3 °F (36.8 °C)  Heart Rate:  [72-87] 72  Resp:  [16] 16  BP: (121-144)/(65-76) 125/68  Flow (L/min):  [2] 2      Physical Exam:   Constitutional: Awake, alert, No acute distress    Eyes: PERRLA, sclerae anicteric, no conjunctival injection   HENT: NCAT, mucous membranes moist   Neck: Supple, no thyromegaly, no  lymphadenopathy, trachea midline   Respiratory: Clear to auscultation bilaterally, nonlabored respirations    Cardiovascular: RRR, no murmurs, rubs, or gallops, palpable pedal pulses bilaterally   Gastrointestinal: Positive bowel sounds, soft, nontender, nondistended   Musculoskeletal: No bilateral ankle edema, no clubbing or cyanosis to extremities   Psychiatric: Appropriate affect, cooperative   Neurologic: Oriented x 3, strength symmetric in all extremities, Cranial Nerves grossly intact to confrontation, speech clear   Skin: No rashes     Result Review    Result Review:  I have personally reviewed the results from the time of this admission to 4/19/2023 20:09 EDT and agree with these findings:  [x]  Laboratory  []  Microbiology  [x]  Radiology  [x]  EKG/Telemetry   [x]  Cardiology/Vascular   []  Pathology  [x]  Old records  []  Other:  Most notable findings include:     CMP        4/19/2023    14:28   CMP   Glucose 182     BUN 21     Creatinine 0.99     EGFR 80.4     Sodium 137     Potassium 5.0     Chloride 101     Calcium 9.6     Total Protein 7.6     Albumin 4.2     Globulin 3.4     Total Bilirubin 0.3     Alkaline Phosphatase 72     AST (SGOT) 32     ALT (SGPT) 35     Albumin/Globulin Ratio 1.2     BUN/Creatinine Ratio 21.2     Anion Gap 12.3        CBC        4/19/2023    14:28   CBC   WBC 9.68     RBC 5.10     Hemoglobin 14.3     Hematocrit 43.1     MCV 84.5     MCH 28.0     MCHC 33.2     RDW 13.4     Platelets 235        Lab Results   Component Value Date    TROPONINT 18 (H) 04/19/2023         Assessment & Plan   Assessment / Plan     Brief Patient Summary:  Josue Beck is a 73 y.o. male who presented with multiple presyncopal and syncopal episodes.  EMS caught an episode that was clearly third-degree heart block with a ventricular escape rhythm at 25 bpm.    Active Hospital Problems:  Active Hospital Problems    Diagnosis    • **Complete heart block    • Bradycardia        Assessment:  1.  Intermittent  third-degree heart block  2.  Baseline EKG shows right bundle branch block and left anterior fascicular block  3.  Syncope    Plan:   1.  Put in a temporary pacemaker as the patient had multiple episodes of presyncope and syncope.  2.  We will have to evaluate tomorrow for possible permanent pacemaker implantation.  The patient is not on any rate controlling blood pressure pills.  His potassium was within normal range along with the TSH.    Electronically signed by Mihir Anderson MD, 04/19/23, 8:09 PM EDT.

## 2023-04-20 NOTE — PROGRESS NOTES
[facility]   Hospitalist Progress Note  Date: 2023  Patient Name: Josue Beck  : 1950  MRN: [id number]  Date of admission: 2023      Subjective   Subjective     Chief Complaint:   Passing out    Summary:   Josue Beck is a 73 y.o. male with past medical history of nonobstructive CAD, hypertension, hyperlipidemia who presents to the ER due to 5 episodes of syncope.    Patient has been having several episodes of syncope preceding his hospitalization.  EMS was called to the house where his heart rate was fell between 30 and 40.  Rhythm strip obtained shows complete heart block transient in nature.  Patient brought to the emergency department where he was noted to be with sinus bradycardia on telemetry.  Cardiology was consulted and a transvenous pacer was urgently placed.  Patient was admitted to the ICU for plans for possible permanent pacemaker.    Interval Followup:   Patient intermittently needing paced beats, however pacer not capturing well this morning.  Cardiology saw patient this morning, plans for permanent pacemaker placement later today.    Review of Systems   All systems were reviewed and patient denies any chest pain or shortness of breath    Objective   Objective     Vitals:   Temp:  [97.7 °F (36.5 °C)-98.7 °F (37.1 °C)] 98.1 °F (36.7 °C)  Heart Rate:  [60-78] 65  Resp:  [15-17] 15  BP: (116-155)/() 145/67  Flow (L/min):  [2] 2  Physical Exam    Constitutional: Awake, alert, no acute distress   Eyes: Pupils equal, sclerae anicteric, no conjunctival injection   HENT: NCAT, mucous membranes moist   Neck: Supple, no thyromegaly, no lymphadenopathy, trachea midline   Respiratory: Clear to auscultation bilaterally, nonlabored respirations    Cardiovascular: Bradycardia, no murmurs, rubs, or gallops, palpable pedal pulses bilaterally   Gastrointestinal: Positive bowel sounds, soft, nontender, nondistended   Musculoskeletal: No bilateral ankle edema, no clubbing or cyanosis to  extremities   Psychiatric: Appropriate affect, cooperative   Neurologic: Oriented x 3, strength symmetric in all extremities, Cranial Nerves grossly intact to confrontation, speech clear   Skin: No rashes     Result Review    Result Review:  I have personally reviewed the results from 4/20/2023 and agree with these findings:  []  Laboratory  []  Microbiology  []  Radiology  []  EKG/Telemetry   []  Cardiology/Vascular   []  Pathology  []  Old records  []  Other:    Assessment & Plan   Assessment / Plan     Assessment/Plan:  Complete heart block  Syncope due to the above  Hypertension  Hyperlipidemia  Nonobstructive CAD    Plan:  Patient to the hospital for further care management  Cardiology consulted, appreciate assistance  Patient with transvenous pacer currently in place  Discussed with cardiology, poor capture, with ordering chest x-ray now  Patient to have permanent pacemaker placed later this afternoon  Continue monitoring electrolytes and replacing as needed  Echocardiogram has been ordered, will follow-up  We will resume patient's home medications following pacemaker placement     Discussed plan with RN, cardiologist    DVT prophylaxis:  Mechanical DVT prophylaxis orders are present.    CODE STATUS:   Code Status (Patient has no pulse and is not breathing): CPR (Attempt to Resuscitate)  Medical Interventions (Patient has pulse or is breathing): Full Support

## 2023-04-20 NOTE — CONSULTS
Cardiology Consult Note  [facility]          Patient Identification:  Josue Beck      [id number]  73 y.o.        male  1950            Reason for Consultation: Syncope and bradycardia complete heart block    PCP: Pasha Gramajo MD    History of Present Illness:     Patient is a 73-year-old with a previous history of CAD, essential hypertension, diabetes type 2 and dyslipidemia who initially presented with presyncopal and syncopal episodes over the last few days on the day of admission had been worse initially was noted to be in 30 heart block with a ventricular escape rhythm in 25 he did go back into normal sinus rhythm but was intermittently having AV block.  The patient was taken to Cath Lab underwent transvenous pacing resting comfortably this morning in bed does complain of some back pain issues    Past History:  Past Medical History:   Diagnosis Date   • Coronary artery disease    • Hypertension      History reviewed. No pertinent surgical history.  Allergies   Allergen Reactions   • Ciprofloxacin Rash     Social History     Socioeconomic History   • Marital status:    Tobacco Use   • Smoking status: Former   • Smokeless tobacco: Never   Vaping Use   • Vaping Use: Never used   Substance and Sexual Activity   • Alcohol use: Not Currently   • Drug use: Never   • Sexual activity: Defer     History reviewed. No pertinent family history.    Medications:  Prior to Admission medications    Medication Sig Start Date End Date Taking? Authorizing Provider   aspirin 81 MG EC tablet Take 1 tablet by mouth Daily. 1/13/23  Yes Paul Young MD   esomeprazole (nexIUM) 20 MG capsule Take 1 capsule by mouth Every Morning Before Breakfast.   Yes ProvideraRdha MD   fexofenadine (ALLEGRA) 180 MG tablet Take 1 tablet by mouth Daily.   Yes Radha Hernandez MD   glucosamine-chondroitin 500-400 MG capsule capsule Take 1 capsule by mouth 2 (Two) Times a Day With Meals.    "Yes Provider, MD Radha   Jardiance 10 MG tablet tablet Take 1 tablet by mouth Daily. 1/13/23  Yes Paul Young MD   lisinopril (PRINIVIL,ZESTRIL) 10 MG tablet Take 1 tablet by mouth Daily. 9/28/21  Yes Radha Hernandez MD   metFORMIN (GLUCOPHAGE) 500 MG tablet Take 1 tablet by mouth 3 (Three) Times a Day As Needed. 9/7/21  Yes Radha Hernandez MD   rosuvastatin (CRESTOR) 40 MG tablet Take 1 tablet by mouth every night at bedtime. 3/15/23  Yes Paul Young MD      Current medications:  insulin lispro, 2-7 Units, Subcutaneous, TID With Meals  rosuvastatin, 40 mg, Oral, Nightly  sodium chloride, 10 mL, Intravenous, Q12H      Current IV drips:  sodium chloride, 100 mL/hr, Last Rate: 100 mL/hr (04/19/23 1905)        Review of Systems   Constitutional: Negative for chills, fever and weight loss.   HENT: Negative for congestion and nosebleeds.    Cardiovascular: Positive for syncope. Negative for orthopnea and paroxysmal nocturnal dyspnea.   Respiratory: Negative for cough and shortness of breath.    Endocrine: Negative for cold intolerance and heat intolerance.   Skin: Negative for rash.   Musculoskeletal: Negative for back pain and muscle weakness.   Gastrointestinal: Negative for abdominal pain, nausea and vomiting.   Genitourinary: Negative for dysuria and nocturia.   Neurological: Negative for dizziness and light-headedness.   Psychiatric/Behavioral: Negative for altered mental status and hallucinations.         Physical Exam    /67   Pulse 64   Temp 97.7 °F (36.5 °C) (Oral)   Resp 15   Ht 182.9 cm (72\")   Wt 101 kg (221 lb 12.5 oz)   SpO2 96%   BMI 30.08 kg/m²  Body mass index is 30.08 kg/m².   Oxygen saturation   @FLOWAN(10::1)@ SpO2  Min: 92 %  Max: 98 %    General Appearance:   · no acute distress  · Alert and oriented x3  HENT:   · lips not cyanotic  · Atraumatic  Neck:  · thyroid not enlarged  · supple  Respiratory:  · no respiratory distress  · normal breath " sounds  · no rales  Cardiovascular:  · no jugular venous distention  · regular rhythm  · apical impulse normal  · S1 normal, S2 normal  · no S3, no S4   · no murmur  · no rub, no thrill  · no carotid bruit  · pedal pulses normal  · lower extremity edema: none    Gastrointestinal:   · bowel sounds normal  · non-tender  · no hepatomegaly, no splenomegaly  Musculoskeletal:  · no clubbing of fingers.   · normocephalic, head atraumatic  Skin:   · warm, dry  · No rashes  Neuro/Psychiatric:  · normal mood and affect  · judgement and insight appropriate      Cardiographics:     ECG  (personally reviewed)        Telemetry:  (personally reviewed) normal sinus rhythm with a bundle branch block abnormality intermittent high-grade AV block some ventricular paced beats     Results for orders placed during the hospital encounter of 04/19/23    Adult Transthoracic Echo Complete W/ Cont if Necessary Per Protocol    Interpretation Summary  •  Left ventricular ejection fraction appears to be 56 - 60%.  •  Left ventricular diastolic function is consistent with (grade I) impaired relaxation and age.  •  Estimated right ventricular systolic pressure from tricuspid regurgitation is normal (<35 mmHg).  •  Mild dilation of the aortic root is present at 4.2 cm.  •  No significant valvular disease.         No results found for this or any previous visit.      Cardiolite (Tc-99m Sestamibi) stress test   Lab Review:       CBC        4/19/2023    14:28 4/20/2023    03:02   CBC   WBC 9.68   7.88     RBC 5.10   4.41     Hemoglobin 14.3   12.8     Hematocrit 43.1   37.3     MCV 84.5   84.6     MCH 28.0   29.0     MCHC 33.2   34.3     RDW 13.4   13.4     Platelets 235   214         CMP        4/19/2023    14:28 4/20/2023    03:02   CMP   Glucose 182   213     BUN 21   22     Creatinine 0.99   0.94     EGFR 80.4   85.6     Sodium 137   137     Potassium 5.0   4.1     Chloride 101   105     Calcium 9.6   8.7     Total Protein 7.6   6.2     Albumin 4.2    3.5     Globulin 3.4   2.7     Total Bilirubin 0.3   0.2     Alkaline Phosphatase 72   64     AST (SGOT) 32   19     ALT (SGPT) 35   25     Albumin/Globulin Ratio 1.2   1.3     BUN/Creatinine Ratio 21.2   23.4     Anion Gap 12.3   8.6          CARDIAC LABS:      Lab 04/19/23  1428   HSTROP T 18*      No results found for: DIGOXIN   Lab Results   Component Value Date    TSH 2.390 04/19/2023           Invalid input(s): LDLCALC  No results found for: POCTROP  No components found for: DDIMERQUAN  Lab Results   Component Value Date    MG 2.0 04/20/2023             CARDIAC LABS:      Lab 04/19/23  1428   HSTROP T 18*        Imaging:  CXR  1. Trans venous pacer lead entering from inferior approach. 2. Question trace right effusion     Assessment:    Complete heart block    Bradycardia      Complete heart block with underlying intrinsic conduction findings on baseline EKG no reversible etiologies would recommend proceeding with dual-chamber pacemaker.The risk for a double chamber pacemaker insertion was discussed with the patient.  Including the risk of infection, bleeding, lead dislodgment, pneumothorax, heart perforation, tamponade, and death.  After discussion with the patient using shared decision making agreed on proceeding     Plan:  1.  Proceed with dual-chamber pacemaker implantation today      Thank you for allowing us to share in [facility].            Manjit Chavez MD   4/20/2023    08:59 EDT

## 2023-04-21 ENCOUNTER — APPOINTMENT (OUTPATIENT)
Dept: GENERAL RADIOLOGY | Facility: HOSPITAL | Age: 73
DRG: 244 | End: 2023-04-21
Payer: MEDICARE

## 2023-04-21 ENCOUNTER — APPOINTMENT (OUTPATIENT)
Dept: CARDIOLOGY | Facility: HOSPITAL | Age: 73
DRG: 244 | End: 2023-04-21
Payer: MEDICARE

## 2023-04-21 LAB
ANION GAP SERPL CALCULATED.3IONS-SCNC: 10.2 MMOL/L (ref 5–15)
BASOPHILS # BLD AUTO: 0.05 10*3/MM3 (ref 0–0.2)
BASOPHILS NFR BLD AUTO: 0.6 % (ref 0–1.5)
BH CV ECHO MEAS - EDV(MOD-SP2): 74 ML
BH CV ECHO MEAS - EDV(MOD-SP4): 74 ML
BH CV ECHO MEAS - EF(MOD-BP): 57.2 %
BH CV ECHO MEAS - ESV(MOD-SP2): 32 ML
BH CV ECHO MEAS - ESV(MOD-SP4): 32 ML
BUN SERPL-MCNC: 18 MG/DL (ref 8–23)
BUN/CREAT SERPL: 23.1 (ref 7–25)
CALCIUM SPEC-SCNC: 8.4 MG/DL (ref 8.6–10.5)
CHLORIDE SERPL-SCNC: 103 MMOL/L (ref 98–107)
CO2 SERPL-SCNC: 22.8 MMOL/L (ref 22–29)
CREAT SERPL-MCNC: 0.78 MG/DL (ref 0.76–1.27)
DEPRECATED RDW RBC AUTO: 42.1 FL (ref 37–54)
EGFRCR SERPLBLD CKD-EPI 2021: 94.2 ML/MIN/1.73
EOSINOPHIL # BLD AUTO: 0.22 10*3/MM3 (ref 0–0.4)
EOSINOPHIL NFR BLD AUTO: 2.8 % (ref 0.3–6.2)
ERYTHROCYTE [DISTWIDTH] IN BLOOD BY AUTOMATED COUNT: 13.6 % (ref 12.3–15.4)
GLUCOSE BLDC GLUCOMTR-MCNC: 134 MG/DL (ref 70–99)
GLUCOSE BLDC GLUCOMTR-MCNC: 136 MG/DL (ref 70–99)
GLUCOSE BLDC GLUCOMTR-MCNC: 207 MG/DL (ref 70–99)
GLUCOSE BLDC GLUCOMTR-MCNC: 85 MG/DL (ref 70–99)
GLUCOSE SERPL-MCNC: 250 MG/DL (ref 65–99)
HCT VFR BLD AUTO: 39.9 % (ref 37.5–51)
HGB BLD-MCNC: 13.4 G/DL (ref 13–17.7)
IMM GRANULOCYTES # BLD AUTO: 0.02 10*3/MM3 (ref 0–0.05)
IMM GRANULOCYTES NFR BLD AUTO: 0.3 % (ref 0–0.5)
LYMPHOCYTES # BLD AUTO: 1.85 10*3/MM3 (ref 0.7–3.1)
LYMPHOCYTES NFR BLD AUTO: 23.5 % (ref 19.6–45.3)
MAGNESIUM SERPL-MCNC: 1.8 MG/DL (ref 1.6–2.4)
MAGNESIUM SERPL-MCNC: 2 MG/DL (ref 1.6–2.4)
MAXIMAL PREDICTED HEART RATE: 147 BPM
MCH RBC QN AUTO: 28.8 PG (ref 26.6–33)
MCHC RBC AUTO-ENTMCNC: 33.6 G/DL (ref 31.5–35.7)
MCV RBC AUTO: 85.8 FL (ref 79–97)
MONOCYTES # BLD AUTO: 0.8 10*3/MM3 (ref 0.1–0.9)
MONOCYTES NFR BLD AUTO: 10.2 % (ref 5–12)
NEUTROPHILS NFR BLD AUTO: 4.94 10*3/MM3 (ref 1.7–7)
NEUTROPHILS NFR BLD AUTO: 62.6 % (ref 42.7–76)
NRBC BLD AUTO-RTO: 0 /100 WBC (ref 0–0.2)
PHOSPHATE SERPL-MCNC: 3.1 MG/DL (ref 2.5–4.5)
PLATELET # BLD AUTO: 218 10*3/MM3 (ref 140–450)
PMV BLD AUTO: 9.3 FL (ref 6–12)
POTASSIUM SERPL-SCNC: 4.1 MMOL/L (ref 3.5–5.2)
QT INTERVAL: 375 MS
RBC # BLD AUTO: 4.65 10*6/MM3 (ref 4.14–5.8)
SODIUM SERPL-SCNC: 136 MMOL/L (ref 136–145)
STRESS TARGET HR: 125 BPM
WBC NRBC COR # BLD: 7.88 10*3/MM3 (ref 3.4–10.8)

## 2023-04-21 PROCEDURE — 99233 SBSQ HOSP IP/OBS HIGH 50: CPT | Performed by: INTERNAL MEDICINE

## 2023-04-21 PROCEDURE — 93321 DOPPLER ECHO F-UP/LMTD STD: CPT

## 2023-04-21 PROCEDURE — 93321 DOPPLER ECHO F-UP/LMTD STD: CPT | Performed by: INTERNAL MEDICINE

## 2023-04-21 PROCEDURE — 83735 ASSAY OF MAGNESIUM: CPT | Performed by: SPECIALIST

## 2023-04-21 PROCEDURE — 93005 ELECTROCARDIOGRAM TRACING: CPT | Performed by: INTERNAL MEDICINE

## 2023-04-21 PROCEDURE — 94799 UNLISTED PULMONARY SVC/PX: CPT

## 2023-04-21 PROCEDURE — 83735 ASSAY OF MAGNESIUM: CPT | Performed by: INTERNAL MEDICINE

## 2023-04-21 PROCEDURE — 93308 TTE F-UP OR LMTD: CPT

## 2023-04-21 PROCEDURE — 63710000001 INSULIN LISPRO (HUMAN) PER 5 UNITS: Performed by: INTERNAL MEDICINE

## 2023-04-21 PROCEDURE — 85025 COMPLETE CBC W/AUTO DIFF WBC: CPT | Performed by: INTERNAL MEDICINE

## 2023-04-21 PROCEDURE — 99024 POSTOP FOLLOW-UP VISIT: CPT | Performed by: SPECIALIST

## 2023-04-21 PROCEDURE — 94761 N-INVAS EAR/PLS OXIMETRY MLT: CPT

## 2023-04-21 PROCEDURE — 84100 ASSAY OF PHOSPHORUS: CPT | Performed by: INTERNAL MEDICINE

## 2023-04-21 PROCEDURE — 93308 TTE F-UP OR LMTD: CPT | Performed by: INTERNAL MEDICINE

## 2023-04-21 PROCEDURE — 71045 X-RAY EXAM CHEST 1 VIEW: CPT

## 2023-04-21 PROCEDURE — 80048 BASIC METABOLIC PNL TOTAL CA: CPT | Performed by: INTERNAL MEDICINE

## 2023-04-21 PROCEDURE — 93325 DOPPLER ECHO COLOR FLOW MAPG: CPT | Performed by: INTERNAL MEDICINE

## 2023-04-21 PROCEDURE — 82962 GLUCOSE BLOOD TEST: CPT

## 2023-04-21 PROCEDURE — 25010000002 CEFAZOLIN IN DEXTROSE 2-4 GM/100ML-% SOLUTION: Performed by: INTERNAL MEDICINE

## 2023-04-21 PROCEDURE — 93325 DOPPLER ECHO COLOR FLOW MAPG: CPT

## 2023-04-21 RX ORDER — METOPROLOL SUCCINATE 25 MG/1
25 TABLET, EXTENDED RELEASE ORAL EVERY 12 HOURS SCHEDULED
Status: DISCONTINUED | OUTPATIENT
Start: 2023-04-21 | End: 2023-04-22

## 2023-04-21 RX ORDER — CEPHALEXIN 500 MG/1
500 CAPSULE ORAL EVERY 8 HOURS SCHEDULED
Status: COMPLETED | OUTPATIENT
Start: 2023-04-21 | End: 2023-04-22

## 2023-04-21 RX ORDER — CEPHALEXIN 500 MG/1
500 CAPSULE ORAL 3 TIMES DAILY
Qty: 12 CAPSULE | Refills: 0 | Status: SHIPPED | OUTPATIENT
Start: 2023-04-21 | End: 2023-04-26

## 2023-04-21 RX ORDER — METOPROLOL SUCCINATE 25 MG/1
25 TABLET, EXTENDED RELEASE ORAL EVERY 12 HOURS SCHEDULED
Qty: 60 TABLET | Refills: 0 | Status: SHIPPED | OUTPATIENT
Start: 2023-04-21 | End: 2023-04-22 | Stop reason: HOSPADM

## 2023-04-21 RX ORDER — AMIODARONE HYDROCHLORIDE 200 MG/1
400 TABLET ORAL
Status: DISCONTINUED | OUTPATIENT
Start: 2023-04-21 | End: 2023-04-22

## 2023-04-21 RX ORDER — ASPIRIN 81 MG/1
81 TABLET ORAL DAILY
Qty: 90 TABLET | Refills: 2 | Status: SHIPPED | OUTPATIENT
Start: 2023-04-22

## 2023-04-21 RX ADMIN — METOPROLOL SUCCINATE 25 MG: 25 TABLET, EXTENDED RELEASE ORAL at 08:52

## 2023-04-21 RX ADMIN — ROSUVASTATIN 40 MG: 20 TABLET, FILM COATED ORAL at 21:01

## 2023-04-21 RX ADMIN — CEPHALEXIN 500 MG: 500 CAPSULE ORAL at 21:01

## 2023-04-21 RX ADMIN — SODIUM CHLORIDE 100 ML/HR: 9 INJECTION, SOLUTION INTRAVENOUS at 04:23

## 2023-04-21 RX ADMIN — METOPROLOL SUCCINATE 25 MG: 25 TABLET, EXTENDED RELEASE ORAL at 21:01

## 2023-04-21 RX ADMIN — Medication 10 ML: at 21:03

## 2023-04-21 RX ADMIN — CEPHALEXIN 500 MG: 500 CAPSULE ORAL at 16:11

## 2023-04-21 RX ADMIN — INSULIN LISPRO 3 UNITS: 100 INJECTION, SOLUTION INTRAVENOUS; SUBCUTANEOUS at 12:16

## 2023-04-21 RX ADMIN — AMIODARONE HYDROCHLORIDE 400 MG: 200 TABLET ORAL at 16:11

## 2023-04-21 RX ADMIN — CEFAZOLIN SODIUM 2 G: 2 INJECTION, SOLUTION INTRAVENOUS at 07:38

## 2023-04-21 NOTE — PROGRESS NOTES
[facility]   Hospitalist Progress Note  Date: 2023  Patient Name: Josue Beck  : 1950  MRN: [id number]  Date of admission: 2023      Subjective   Subjective     Chief Complaint:   Passing out    Summary:   Josue Beck is a 73 y.o. male with past medical history of nonobstructive CAD, hypertension, hyperlipidemia who presents to the ER due to 5 episodes of syncope.    Patient has been having several episodes of syncope preceding his hospitalization.  EMS was called to the house where his heart rate was fell between 30 and 40.  Rhythm strip obtained shows complete heart block transient in nature.  Patient brought to the emergency department where he was noted to be with sinus bradycardia on telemetry.  Cardiology was consulted and a transvenous pacer was urgently placed.  Patient was admitted to the ICU, permanent pacemaker placed on .    Interval Followup:   Discussed with cardiology this morning a chest x-ray was completed and interrogation was obtained, patient was felt safe to discharge home.  However later, patient started having symptomatic palpitations.  Cardiology decided to start amiodarone in addition to patient's metoprolol.  We will monitor patient overnight.    Review of Systems   All systems were reviewed and patient denies any chest pain or shortness of breath, no palpitations on rounds this morning    Objective   Objective     Vitals:   Temp:  [97.5 °F (36.4 °C)-98.2 °F (36.8 °C)] 97.5 °F (36.4 °C)  Heart Rate:  [63-85] 66  BP: (121-152)/(65-77) 125/77  Physical Exam    Constitutional: Awake, alert, no acute distress   Eyes: Pupils equal, sclerae anicteric, no conjunctival injection   HENT: NCAT, mucous membranes moist   Neck: Supple, no thyromegaly, no lymphadenopathy, trachea midline   Respiratory: Clear to auscultation bilaterally, nonlabored respirations    Cardiovascular: Regular rate and rhythm, no murmurs, rubs, or gallops, palpable pedal pulses  bilaterally   Gastrointestinal: Positive bowel sounds, soft, nontender, nondistended   Musculoskeletal: No bilateral ankle edema, no clubbing or cyanosis to extremities   Psychiatric: Appropriate affect, cooperative   Neurologic: Oriented x 3, strength symmetric in all extremities, Cranial Nerves grossly intact to confrontation, speech clear   Skin: Surgical dressing clean dry and intact    Result Review    Result Review:  I have personally reviewed the results from 4/21/2023 and agree with these findings:  []  Laboratory  []  Microbiology  []  Radiology  []  EKG/Telemetry   []  Cardiology/Vascular   []  Pathology  []  Old records  []  Other:    Assessment & Plan   Assessment / Plan     Assessment/Plan:  Complete heart block  Syncope due to the above  Symptomatic palpitations  Hypertension  Hyperlipidemia  Nonobstructive CAD    Plan:  Patient to the hospital for further care management  Cardiology consulted, appreciate assistance  Permanent pacemaker placed on the 20st  Interrogation and checks x-ray normal on the morning of 21st  However ectopy noted, symptomatic therefore monitoring patient overnight  Patient had been started on beta-blocker by cardiology, now adding amiodarone  Patient will be discharged on a short course of Keflex  Patient will be provided sling on discharge     Discussed plan with RN, cardiologist    DVT prophylaxis:  Mechanical DVT prophylaxis orders are present.    CODE STATUS:   Code Status (Patient has no pulse and is not breathing): CPR (Attempt to Resuscitate)  Medical Interventions (Patient has pulse or is breathing): Full Support

## 2023-04-21 NOTE — PLAN OF CARE
Goal Outcome Evaluation:   Plan to transfer to monitored bed.   No additional complaints at this time vss

## 2023-04-21 NOTE — PLAN OF CARE
Goal Outcome Evaluation:  Plan of Care Reviewed With: patient, spouse        Progress: improving  Outcome Evaluation: pt post pacer insertion.  no complaints of pain, vitals stable

## 2023-04-21 NOTE — PROGRESS NOTES
[facility]   Cardiology Progress Note      Patient Name: Josue Beck  : 1950  MRN: [id number]  Primary Care Physician:  Pasha Gramajo MD  Referring Physician: No ref. provider found  Date of admission: 2023    Subjective   Subjective     Chief Complaint: Complete heart block    HPI:  Josue Beck is a 73 y.o. male with symptomatic complete heart block status post permanent pacemaker doing well.    REVIEW OF SYSTEMS    Constitutional:    No fever, no weight loss  Skin:     No rash  Otolaryngeal:    No difficulty swallowing  Cardiovascular:  No chest pain or shortness of breath  Pulmonary:    No cough, no sputum production    Objective    Objective     Vitals:   Vitals:    23 0400 23 0500 23 0754 23 0757   BP: 138/69 138/73     Pulse: 63 68 69    Resp:       Temp: 98.2 °F (36.8 °C)   97.5 °F (36.4 °C)   TempSrc: Oral   Oral   SpO2: 96%  96%    Weight:       Height:                Physical Exam:   Constitutional: Awake, alert, No acute distress    Eyes: PERRLA, sclerae anicteric, no conjunctival injection   HENT: NCAT, mucous membranes moist   Neck: Supple, no thyromegaly, no lymphadenopathy, trachea midline   Respiratory: Clear to auscultation bilaterally, nonlabored respirations    Cardiovascular: RRR, no murmurs, rubs, or gallops, palpable pedal pulses bilaterally   Gastrointestinal: Positive bowel sounds, soft, nontender, nondistended   Musculoskeletal: No bilateral ankle edema, no clubbing or cyanosis to extremities   Psychiatric: Appropriate affect, cooperative   Neurologic: Oriented x 3, strength symmetric in all extremities, Cranial Nerves grossly intact to confrontation, speech clear   Skin: No rashes.      Current medications:  ceFAZolin, 2 g, Intravenous, Q8H  insulin lispro, 2-7 Units, Subcutaneous, TID With Meals  rosuvastatin, 40 mg, Oral, Nightly  sodium chloride, 10 mL, Intravenous, Q12H  sodium chloride, 3 mL, Intravenous, Q12H      Current IV  drips:  sodium chloride, 100 mL/hr, Last Rate: 100 mL/hr (04/21/23 0423)        Result Review    Result Review:  I have personally reviewed the results from the time of this admission to 4/21/2023 08:28 EDT and agree with these findings:  []  Laboratory  []  EKG/Telemetry   []  Cardiology/Vascular   []  Radiology         CBC        4/19/2023    14:28 4/20/2023    03:02 4/21/2023    03:34   CBC   WBC 9.68   7.88   7.88     RBC 5.10   4.41   4.65     Hemoglobin 14.3   12.8   13.4     Hematocrit 43.1   37.3   39.9     MCV 84.5   84.6   85.8     MCH 28.0   29.0   28.8     MCHC 33.2   34.3   33.6     RDW 13.4   13.4   13.6     Platelets 235   214   218       CMP        4/19/2023    14:28 4/20/2023    03:02 4/21/2023    03:34   CMP   Glucose 182   213   250     BUN 21   22   18     Creatinine 0.99   0.94   0.78     EGFR 80.4   85.6   94.2     Sodium 137   137   136     Potassium 5.0   4.1   4.1     Chloride 101   105   103     Calcium 9.6   8.7   8.4     Total Protein 7.6   6.2      Albumin 4.2   3.5      Globulin 3.4   2.7      Total Bilirubin 0.3   0.2      Alkaline Phosphatase 72   64      AST (SGOT) 32   19      ALT (SGPT) 35   25      Albumin/Globulin Ratio 1.2   1.3      BUN/Creatinine Ratio 21.2   23.4   23.1     Anion Gap 12.3   8.6   10.2       Results for orders placed during the hospital encounter of 04/19/23    Adult Transthoracic Echo Complete W/ Cont if Necessary Per Protocol    Interpretation Summary  •  Left ventricular ejection fraction appears to be 56 - 60%.  •  Left ventricular diastolic function is consistent with (grade I) impaired relaxation and age.  •  Estimated right ventricular systolic pressure from tricuspid regurgitation is normal (<35 mmHg).  •  Mild dilation of the aortic root is present at 4.2 cm.  •  No significant valvular disease.        No results found for: PROBNP      Telemetry reviewed pacemaker rhythm    Assessment / Plan     ASSESSMENT:    Complete heart block     Bradycardia  Nonsustained ventricular tachycardia  Status post permanent pacemaker      PLAN:  1.  Pacemaker interrogation prior to discharge, chest x-ray  2.  Add Toprol-XL 25 mg twice a day.    Electronically signed by Paul Young MD, 04/21/23, 8:28 AM EDT.

## 2023-04-22 ENCOUNTER — READMISSION MANAGEMENT (OUTPATIENT)
Dept: CALL CENTER | Facility: HOSPITAL | Age: 73
End: 2023-04-22
Payer: MEDICARE

## 2023-04-22 VITALS
HEIGHT: 72 IN | WEIGHT: 221.78 LBS | SYSTOLIC BLOOD PRESSURE: 135 MMHG | TEMPERATURE: 97.7 F | HEART RATE: 60 BPM | RESPIRATION RATE: 18 BRPM | DIASTOLIC BLOOD PRESSURE: 67 MMHG | BODY MASS INDEX: 30.04 KG/M2 | OXYGEN SATURATION: 97 %

## 2023-04-22 LAB
ANION GAP SERPL CALCULATED.3IONS-SCNC: 7.6 MMOL/L (ref 5–15)
BASOPHILS # BLD AUTO: 0.06 10*3/MM3 (ref 0–0.2)
BASOPHILS NFR BLD AUTO: 0.9 % (ref 0–1.5)
BUN SERPL-MCNC: 15 MG/DL (ref 8–23)
BUN/CREAT SERPL: 20.8 (ref 7–25)
CALCIUM SPEC-SCNC: 8.6 MG/DL (ref 8.6–10.5)
CHLORIDE SERPL-SCNC: 105 MMOL/L (ref 98–107)
CO2 SERPL-SCNC: 24.4 MMOL/L (ref 22–29)
CREAT SERPL-MCNC: 0.72 MG/DL (ref 0.76–1.27)
DEPRECATED RDW RBC AUTO: 42.5 FL (ref 37–54)
EGFRCR SERPLBLD CKD-EPI 2021: 96.5 ML/MIN/1.73
EOSINOPHIL # BLD AUTO: 0.23 10*3/MM3 (ref 0–0.4)
EOSINOPHIL NFR BLD AUTO: 3.3 % (ref 0.3–6.2)
ERYTHROCYTE [DISTWIDTH] IN BLOOD BY AUTOMATED COUNT: 13.7 % (ref 12.3–15.4)
GLUCOSE BLDC GLUCOMTR-MCNC: 182 MG/DL (ref 70–99)
GLUCOSE BLDC GLUCOMTR-MCNC: 212 MG/DL (ref 70–99)
GLUCOSE SERPL-MCNC: 164 MG/DL (ref 65–99)
HCT VFR BLD AUTO: 37.6 % (ref 37.5–51)
HGB BLD-MCNC: 12.8 G/DL (ref 13–17.7)
IMM GRANULOCYTES # BLD AUTO: 0.01 10*3/MM3 (ref 0–0.05)
IMM GRANULOCYTES NFR BLD AUTO: 0.1 % (ref 0–0.5)
LYMPHOCYTES # BLD AUTO: 2.11 10*3/MM3 (ref 0.7–3.1)
LYMPHOCYTES NFR BLD AUTO: 30.4 % (ref 19.6–45.3)
MAGNESIUM SERPL-MCNC: 1.6 MG/DL (ref 1.6–2.4)
MCH RBC QN AUTO: 28.9 PG (ref 26.6–33)
MCHC RBC AUTO-ENTMCNC: 34 G/DL (ref 31.5–35.7)
MCV RBC AUTO: 84.9 FL (ref 79–97)
MONOCYTES # BLD AUTO: 0.89 10*3/MM3 (ref 0.1–0.9)
MONOCYTES NFR BLD AUTO: 12.8 % (ref 5–12)
NEUTROPHILS NFR BLD AUTO: 3.64 10*3/MM3 (ref 1.7–7)
NEUTROPHILS NFR BLD AUTO: 52.5 % (ref 42.7–76)
NRBC BLD AUTO-RTO: 0 /100 WBC (ref 0–0.2)
PLATELET # BLD AUTO: 195 10*3/MM3 (ref 140–450)
PMV BLD AUTO: 9.2 FL (ref 6–12)
POTASSIUM SERPL-SCNC: 4.4 MMOL/L (ref 3.5–5.2)
RBC # BLD AUTO: 4.43 10*6/MM3 (ref 4.14–5.8)
SODIUM SERPL-SCNC: 137 MMOL/L (ref 136–145)
WBC NRBC COR # BLD: 6.94 10*3/MM3 (ref 3.4–10.8)

## 2023-04-22 PROCEDURE — 94799 UNLISTED PULMONARY SVC/PX: CPT

## 2023-04-22 PROCEDURE — 83735 ASSAY OF MAGNESIUM: CPT | Performed by: INTERNAL MEDICINE

## 2023-04-22 PROCEDURE — 82962 GLUCOSE BLOOD TEST: CPT

## 2023-04-22 PROCEDURE — 99239 HOSP IP/OBS DSCHRG MGMT >30: CPT | Performed by: INTERNAL MEDICINE

## 2023-04-22 PROCEDURE — 63710000001 INSULIN LISPRO (HUMAN) PER 5 UNITS: Performed by: INTERNAL MEDICINE

## 2023-04-22 PROCEDURE — 80048 BASIC METABOLIC PNL TOTAL CA: CPT | Performed by: INTERNAL MEDICINE

## 2023-04-22 PROCEDURE — 85025 COMPLETE CBC W/AUTO DIFF WBC: CPT | Performed by: INTERNAL MEDICINE

## 2023-04-22 PROCEDURE — 99024 POSTOP FOLLOW-UP VISIT: CPT | Performed by: INTERNAL MEDICINE

## 2023-04-22 RX ORDER — METOPROLOL SUCCINATE 25 MG/1
12.5 TABLET, EXTENDED RELEASE ORAL EVERY 12 HOURS SCHEDULED
Qty: 30 TABLET | Refills: 0 | Status: SHIPPED | OUTPATIENT
Start: 2023-04-22 | End: 2023-05-22

## 2023-04-22 RX ORDER — METOPROLOL SUCCINATE 25 MG/1
12.5 TABLET, EXTENDED RELEASE ORAL EVERY 12 HOURS SCHEDULED
Status: DISCONTINUED | OUTPATIENT
Start: 2023-04-22 | End: 2023-04-22 | Stop reason: HOSPADM

## 2023-04-22 RX ORDER — AMIODARONE HYDROCHLORIDE 200 MG/1
200 TABLET ORAL EVERY 12 HOURS SCHEDULED
Status: DISCONTINUED | OUTPATIENT
Start: 2023-04-22 | End: 2023-04-22 | Stop reason: HOSPADM

## 2023-04-22 RX ORDER — AMIODARONE HYDROCHLORIDE 200 MG/1
TABLET ORAL
Qty: 42 TABLET | Refills: 0 | Status: SHIPPED | OUTPATIENT
Start: 2023-04-22 | End: 2023-05-28

## 2023-04-22 RX ADMIN — Medication 3 ML: at 09:11

## 2023-04-22 RX ADMIN — METOPROLOL SUCCINATE 25 MG: 25 TABLET, EXTENDED RELEASE ORAL at 09:10

## 2023-04-22 RX ADMIN — CEPHALEXIN 500 MG: 500 CAPSULE ORAL at 05:30

## 2023-04-22 RX ADMIN — INSULIN LISPRO 2 UNITS: 100 INJECTION, SOLUTION INTRAVENOUS; SUBCUTANEOUS at 09:09

## 2023-04-22 RX ADMIN — AMIODARONE HYDROCHLORIDE 400 MG: 200 TABLET ORAL at 09:10

## 2023-04-22 NOTE — PROGRESS NOTES
[facility]     Cardiology Progress Note    Patient Name: Josue Beck  : 1950  MRN: [id number]  Primary Care Physician:  Pasha Gramajo MD  Date of admission: 2023    Subjective   Subjective     Chief Complaint: Follow-up visit, complete heart block    Interval HPI:    Patient reports feeling much better.  No further episodes of palpitations overnight.  He reports some itching at pacemaker insertion site isolated PVCs on telemetry with intermittent paced rhythm.    Review of Systems   All systems were reviewed and negative except for: Dizziness and palpitations, currently subsided but negative for chest pain.    Objective   Objective     Vitals:   Temp:  [97.3 °F (36.3 °C)-98.4 °F (36.9 °C)] 97.6 °F (36.4 °C)  Heart Rate:  [59-75] 63  Resp:  [16-18] 18  BP: (105-131)/(58-97) 122/65  Physical Exam      General : Alert, awake, no acute distress  CVS : Regular rate and rhythm, no murmur, rubs or gallops  Lungs: Clear to auscultation bilaterally, no crackles or rhonchi  Chest wall : Pacemaker insertion site clean without any hematoma, discharge or redness  Abdomen: Soft, nontender, bowel sounds heard in all 4 quadrants  Extremities: Warm, well-perfused, no pedal edema    Scheduled Meds:amiodarone, 200 mg, Oral, Q12H  insulin lispro, 2-7 Units, Subcutaneous, TID With Meals  metoprolol succinate XL, 25 mg, Oral, Q12H  rosuvastatin, 40 mg, Oral, Nightly  sodium chloride, 10 mL, Intravenous, Q12H  sodium chloride, 3 mL, Intravenous, Q12H         Result Review    Result Review:  I have personally reviewed the results from the time of this admission to 2023 10:46 EDT and agree with these findings:  [x]  Laboratory  []  Microbiology  [x]  Radiology  [x]  EKG/Telemetry   [x]  Cardiology/Vascular   []  Pathology  []  Old records  []  Other:  Most notable findings include:     CBC        2023    03:02 2023    03:34 2023    04:49   CBC   WBC 7.88   7.88   6.94     RBC 4.41   4.65   4.43      Hemoglobin 12.8   13.4   12.8     Hematocrit 37.3   39.9   37.6     MCV 84.6   85.8   84.9     MCH 29.0   28.8   28.9     MCHC 34.3   33.6   34.0     RDW 13.4   13.6   13.7     Platelets 214   218   195       CMP        4/20/2023    03:02 4/21/2023    03:34 4/22/2023    04:49   CMP   Glucose 213   250   164     BUN 22   18   15     Creatinine 0.94   0.78   0.72     EGFR 85.6   94.2   96.5     Sodium 137   136   137     Potassium 4.1   4.1   4.4     Chloride 105   103   105     Calcium 8.7   8.4   8.6     Total Protein 6.2       Albumin 3.5       Globulin 2.7       Total Bilirubin 0.2       Alkaline Phosphatase 64       AST (SGOT) 19       ALT (SGPT) 25       Albumin/Globulin Ratio 1.3       BUN/Creatinine Ratio 23.4   23.1   20.8     Anion Gap 8.6   10.2   7.6        CARDIAC LABS:      Lab 04/19/23  1428   HSTROP T 18*      Results for orders placed during the hospital encounter of 04/19/23    Adult Transthoracic Echo Limited W/ Cont if Necessary Per Protocol    Interpretation Summary  •  Left ventricular systolic function is normal. Calculated left ventricular EF = 57.2%      Assessment & Plan   Assessment / Plan     Brief Patient Summary:  Josue Beck is a 73 y.o. male with diabetes and hypertension, presented with multiple episodes of syncope.  He was noted to be in complete heart block with heart rate in 30s.  He is currently status post permanent pacemaker placement    Active Hospital Problems:  Active Hospital Problems    Diagnosis    • **Complete heart block    • Bradycardia      Complete heart block: Status post permanent pacemaker placement.  Telemetry shows mostly sinus rhythm with intermittent episodes of ventricular pacing.  Pacemaker interrogated today and working fine.  Backup rates set of 60.    Symptomatic PVCs : Isolated PVCs and short episodes of NSVT.  Less frequent with addition of amiodarone and beta-blockers    Essential hypertension : Blood pressure borderline low    Diabetes  mellitus    Plan:     Continue amiodarone 200 mg twice daily for 6 more days, then decrease to 200 mg once a day    We will decrease the dose of Toprol-XL to 12 point milligrams twice daily  Continue statins    Discussed pacemaker care with the patient and family members    Stable for discharge from cardiac standpoint  1 week follow-up in cardiology clinic for wound check and suture removal.    Discussed with Dr. Cooper       CODE STATUS:   Code Status (Patient has no pulse and is not breathing): CPR (Attempt to Resuscitate)  Medical Interventions (Patient has pulse or is breathing): Full Support      Electronically signed by Tripp Restrepo MD, 04/22/23, 10:46 AM EDT.

## 2023-04-22 NOTE — OUTREACH NOTE
Prep Survey    Flowsheet Row Responses   [facility] facility patient discharged from? [place]   Is LACE score < 7 ? Yes   Eligibility Not Eligible   What are the reasons patient is not eligible? Other  [Readmission Score low]   Does the patient have one of the following disease processes/diagnoses(primary or secondary)? Other   Prep survey completed? Yes          Chana FERNANDEZ - Registered Nurse

## 2023-04-22 NOTE — DISCHARGE SUMMARY
[facility]         HOSPITALIST  DISCHARGE SUMMARY    Patient Name: Josue Beck  : 1950  MRN: [id number]    Date of Admission: 2023  Date of Discharge:  2023  Primary Care Physician: Pasha Gramajo MD    Consults     Date and Time Order Name Status Description    2023 12:34 AM Inpatient Pulmonology Consult Completed           Active and Resolved Hospital Problems:  Complete heart block status post permanent pacemaker placement  Syncope due to the above  Symptomatic palpitations  Hypertension  Hyperlipidemia  Nonobstructive CAD    Hospital Course     Hospital Course:  Josue Beck is a 73 y.o. male with past medical history of nonobstructive CAD, hypertension, hyperlipidemia who presents to the ER due to 5 episodes of syncope.    Patient has been having several episodes of syncope preceding his hospitalization.  EMS was called to the house where his heart rate was fell between 30 and 40.  Rhythm strip obtained shows complete heart block transient in nature.  Patient brought to the emergency department where he was noted to be with sinus bradycardia on telemetry.  Cardiology was consulted and a transvenous pacer was urgently placed.  Patient was admitted to the ICU, permanent pacemaker placed on .  Pacer interrogated and working fine, backup rate set at 60.  Patient with isolated PVCs and short episodes of NSVT, less frequent following the addition of amnio and beta-blockers.  Patient discharged on  with amiodarone 200 mg twice daily for 6 days then decrease to 200 mg once a day.  Patient's dose of metoprolol decreased to 12.5 twice daily.  Patient will follow-up with cardiology in 1 week.  Patient seen on date of discharge, clinically and hemodynamically stable.  Patient provided concerning signs and symptoms prompting immediate medical attention, patient understanding and agreeable    DISCHARGE Follow Up Recommendations for labs and diagnostics:   Follow-up with PCP  soon as possible  Follow-up with cardiology 1 week      Day of Discharge     Vital Signs:  Temp:  [97.3 °F (36.3 °C)-98.4 °F (36.9 °C)] 97.7 °F (36.5 °C)  Heart Rate:  [59-63] 60  Resp:  [16-18] 18  BP: (105-135)/(58-72) 135/67  Physical Exam:               Constitutional: Awake, alert, no acute distress              Eyes: Pupils equal, sclerae anicteric, no conjunctival injection              HENT: NCAT, mucous membranes moist              Neck: Supple, no thyromegaly, no lymphadenopathy, trachea midline              Respiratory: Clear to auscultation bilaterally, nonlabored respirations               Cardiovascular: Regular rate and rhythm, no murmurs, rubs, or gallops, palpable pedal pulses bilaterally              Gastrointestinal: Positive bowel sounds, soft, nontender, nondistended              Musculoskeletal: No bilateral ankle edema, no clubbing or cyanosis to extremities              Psychiatric: Appropriate affect, cooperative              Neurologic: Oriented x 3, strength symmetric in all extremities, Cranial Nerves grossly intact to confrontation, speech clear              Skin: Surgical dressing clean dry and intact    Discharge Details        Discharge Medications      New Medications      Instructions Start Date   amiodarone 200 MG tablet  Commonly known as: PACERONE   Take 1 tablet by mouth Every 12 (Twelve) Hours for 6 days, THEN 1 tablet Daily for 30 days.   Start Date: April 22, 2023     cephalexin 500 MG capsule  Commonly known as: Keflex   500 mg, Oral, 3 Times Daily      metoprolol succinate XL 25 MG 24 hr tablet  Commonly known as: TOPROL-XL   12.5 mg, Oral, Every 12 Hours Scheduled         Continue These Medications      Instructions Start Date   Aspirin Low Dose 81 MG EC tablet  Generic drug: aspirin   81 mg, Oral, Daily      esomeprazole 20 MG capsule  Commonly known as: nexIUM   20 mg, Oral, Every Morning Before Breakfast      fexofenadine 180 MG tablet  Commonly known as: ALLEGRA   180  mg, Oral, Daily      glucosamine-chondroitin 500-400 MG capsule capsule   1 capsule, Oral, 2 Times Daily With Meals      Jardiance 10 MG tablet tablet  Generic drug: empagliflozin   10 mg, Oral, Daily      lisinopril 10 MG tablet  Commonly known as: PRINIVIL,ZESTRIL   10 mg, Oral, Daily      metFORMIN 500 MG tablet  Commonly known as: GLUCOPHAGE   500 mg, Oral, 3 Times Daily PRN      rosuvastatin 40 MG tablet  Commonly known as: CRESTOR   40 mg, Oral, Every Night at Bedtime             Allergies   Allergen Reactions   • Ciprofloxacin Rash       Discharge Disposition:  Home or Self Care    Diet:  Hospital:No active diet order      Discharge Activity:   Activity Instructions     Activity as Tolerated      Lifting Restrictions      Type of Restriction: Lifting    Lifting Restrictions: No Lifting Until Cleared By Provider    Keep left upper extremity below the level of shoulder until followed up with cardiology.  No heavy lifting          CODE STATUS:  Code Status and Medical Interventions:   Ordered at: 04/19/23 1727     Code Status (Patient has no pulse and is not breathing):    CPR (Attempt to Resuscitate)     Medical Interventions (Patient has pulse or is breathing):    Full Support         Future Appointments   Date Time Provider Department [place]   8/8/2023 12:15 PM [place] [facility]       Additional Instructions for the Follow-ups that You Need to Schedule     Discharge Follow-up with PCP   As directed       Currently Documented PCP:    Pasha Gramajo MD    PCP Phone Number:    [phone]     Follow Up Details: In less than one week         Discharge Follow-up with PCP   As directed       Currently Documented PCP:    Pasha Gramajo MD    PCP Phone Number:    [phone]     Follow Up Details: In less than one week         Discharge Follow-up with Specified Provider: Cardiology; 1 Week   As directed      To: Cardiology    Follow Up: 1 Week         Discharge Follow-up with Specified  Provider: Dr Young; 1 Week   As directed      To: Dr Young    Follow Up: 1 Week               Pertinent  and/or Most Recent Results     PROCEDURES:   dual chamber pacemkaer       Procedure Narrative    Pacemaker procedure note    Procedure: Dual-chamber pacemaker    Indication: Complete heart block symptomatic    Complications: None    Description: Written informed consent was obtained.  The patient was brought to the cardiac catheterization lab in a fasting state.  Prepped and draped sterilely for left subclavian access.  Preoperative antibiotics were administered.  Local anesthesia was infiltrated around the subclavian area and conscious sedation was administered.  An iv venogram was done to confirm patency of the axillary vein. An incision was then made and carried down to the pectoral fascial plane.  A pocket was made in the pectoral fascial plane with both electrocautery and blunt dissection.  Using a micropuncture kit I gained access twice of the left subclavian/axillary vein and J-tipped guidewires were placed.  Two 6 [place] sheaths were placed over the wires and the wires removed.  Under fluoroscopy the right ventricular lead was delivered to the right ventricular apical area using a combination of straight and curved stylets.  The pacing and sensing parameters were acceptable.  The slack was optimized.  The sheath was removed.  The lead was sewn into the pocket using 0-silk suture.  The atrial lead was delivered through the other sheath to the atrial appendage.  The lead tip was deployed.  The pacing and sensing parameters were acceptable.  The slack was optimized.  The sheath was removed.  The lead was sewn into the pocket using 0 silk suture.  The right ventricular lead was tested again the threshold was still good but the sensing had dropped down to just below 4 mV female position looks similar it was elected to uncoil the helix and advance the lead to a different apical location.  This  was unscrewed under fluoroscopic guidance tested and found to be pacing and sensing acceptable threshold with no diaphragmatic stimulation at 10 V.  Next the leads were connected to the pulse generator and the torque screws were tightened.  The pocket was washed with normal saline solution.  Hemostasis appeared excellent.  The lead slack and generator were placed into the pocket and the pocket was closed with a  2-0 vicryl layer for the fasica and 4-0 Vicryl suture for the subcuticular layer.  Counts were correct.  The patient tolerated the procedure well.  There were no early complications.      Blood loss minimal  Device implanted: Sendio Accolade MRI model number L3 1 1 serial #[id number]  Right atrial lead dexterous Ingevity model #7841 serial number [id number]  Right ventricular lead Ingevity model #7842 serial #[id number]      Atrial lead: 2.8 mV threshold 0.6 V impedance 566  Right ventricular lead: 10.9 mV threshold 0.7 V impedance 955      Pacing mode DDD  Lower rate limit 60/upper rate limit 130  Paced AV delay 190-250  Sensed AV delay 180-240      Conclusions:    Successful dual-chamber pacemaker implant    Management plan:    The patient will be observed overnight, the device will be interrogated in the morning, outpatient follow-up will be arranged         LAB RESULTS:      Lab 04/22/23 0449 04/21/23  0334 04/20/23 0302 04/19/23  1428   WBC 6.94 7.88 7.88 9.68   HEMOGLOBIN 12.8* 13.4 12.8* 14.3   HEMATOCRIT 37.6 39.9 37.3* 43.1   PLATELETS 195 218 214 235   NEUTROS ABS 3.64 4.94 4.46 7.53*   IMMATURE GRANS (ABS) 0.01 0.02 0.02 0.03   LYMPHS ABS 2.11 1.85 2.28 1.35   MONOS ABS 0.89 0.80 0.89 0.65   EOS ABS 0.23 0.22 0.18 0.07   MCV 84.9 85.8 84.6 84.5         Lab 04/22/23  0449 04/21/23  1338 04/21/23  0334 04/20/23  0302 04/19/23  1428   SODIUM 137  --  136 137 137   POTASSIUM 4.4  --  4.1 4.1 5.0   CHLORIDE 105  --  103 105 101   CO2 24.4  --  22.8 23.4 23.7   ANION GAP 7.6  --  10.2 8.6 12.3   BUN  15  --  18 22 21   CREATININE 0.72*  --  0.78 0.94 0.99   EGFR 96.5  --  94.2 85.6 80.4   GLUCOSE 164*  --  250* 213* 182*   CALCIUM 8.6  --  8.4* 8.7 9.6   MAGNESIUM 1.6 1.8 2.0 2.0 1.6   PHOSPHORUS  --   --  3.1 3.6  --    TSH  --   --   --   --  2.390         Lab 04/20/23  0302 04/19/23  1428   TOTAL PROTEIN 6.2 7.6   ALBUMIN 3.5 4.2   GLOBULIN 2.7 3.4   ALT (SGPT) 25 35   AST (SGOT) 19 32   BILIRUBIN 0.2 0.3   ALK PHOS 64 72         Lab 04/19/23  1428   HSTROP T 18*                 Brief Urine Lab Results     None        Microbiology Results (last 10 days)     ** No results found for the last 240 hours. **          CT Head Without Contrast    Result Date: 4/19/2023  Impression:   1. No acute intracranial abnormality     Joshua Dawn M.D.       Electronically Signed and Approved By: Joshua Dawn M.D. on 4/19/2023 at 16:23             XR Chest 1 View    Result Date: 4/21/2023  Impression:   1. No acute cardiopulmonary disease 2. Left subclavian transvenous pacemaker remains in place and unchanged in position from 04/20/2023.        OLIVA GARCIA MD       Electronically Signed and Approved By: OLIVA GARCIA MD on 4/21/2023 at 9:23             XR Chest 1 View    Result Date: 4/20/2023  Impression:   1. No acute cardiopulmonary disease 2. Status post placement of a dual lead transvenous pacemaker without evidence of complication       Joshua Dawn M.D.       Electronically Signed and Approved By: Joshua Dawn M.D. on 4/20/2023 at 19:05             XR Chest 1 View    Result Date: 4/20/2023  Impression:    1. Trans venous pacer lead entering from inferior approach.  2. Question trace right effusion.       TORI CHERY MD       Electronically Signed and Approved By: TORI CHERY MD on 4/20/2023 at 8:28                       Results for orders placed during the hospital encounter of 04/19/23    Adult Transthoracic Echo Limited W/ Cont if Necessary Per Protocol    Interpretation Summary  •  Left ventricular systolic  function is normal. Calculated left ventricular EF = 57.2%      Labs Pending at Discharge:        Time spent on Discharge including face to face service:  40 minutes    Electronically signed by Kj Cooper MD, 04/22/23, 7:09 PM EDT.

## 2023-04-26 ENCOUNTER — CLINICAL SUPPORT NO REQUIREMENTS (OUTPATIENT)
Dept: CARDIOLOGY | Facility: CLINIC | Age: 73
End: 2023-04-26
Payer: MEDICARE

## 2023-04-26 DIAGNOSIS — I44.2 COMPLETE HEART BLOCK: Primary | ICD-10-CM

## 2023-04-26 DIAGNOSIS — R00.1 BRADYCARDIA: ICD-10-CM

## 2023-04-26 DIAGNOSIS — Z95.0 PRESENCE OF CARDIAC PACEMAKER: ICD-10-CM

## 2023-05-07 LAB
QT INTERVAL: 375 MS
QT INTERVAL: 387 MS

## 2023-05-15 ENCOUNTER — NURSE TRIAGE (OUTPATIENT)
Dept: CALL CENTER | Facility: HOSPITAL | Age: 73
End: 2023-05-15
Payer: MEDICARE

## 2023-05-15 ENCOUNTER — TELEPHONE (OUTPATIENT)
Dept: CARDIOLOGY | Facility: CLINIC | Age: 73
End: 2023-05-15
Payer: MEDICARE

## 2023-05-15 NOTE — TELEPHONE ENCOUNTER
Caller concerned about her  who has been having increasing shortness of breath when he moves around. Since he had his PPM placed on April 20,2023. This past weekend it has been worse.   He is on Metoprolol Succinate XL 25 mg and Amiodarone 200 mg.  Reviewed guideline with caller, advises he speak with Provider within one hour.  Warm Transfer to [facility] in Cardiology office.     Reason for Disposition  • [1] MILD difficulty breathing (e.g., minimal/no SOB at rest, SOB with walking, pulse <100) AND [2] NEW-onset or WORSE than normal    Additional Information  • Negative: SEVERE difficulty breathing (e.g., struggling for each breath, speaks in single words)  • Negative: [1] Breathing stopped AND [2] hasn't returned  • Negative: Choking on something  • Negative: Bluish (or gray) lips or face now  • Negative: Difficult to awaken or acting confused (e.g., disoriented, slurred speech)  • Negative: Passed out (i.e., lost consciousness, collapsed and was not responding)  • Negative: Wheezing started suddenly after medicine, an allergic food or bee sting  • Negative: Stridor (harsh sound while breathing in)  • Negative: Slow, shallow and weak breathing  • Negative: Sounds like a life-threatening emergency to the triager  • Negative: Chest pain  • Negative: [1] Wheezing (high pitched whistling sound) AND [2] previous asthma attacks or use of asthma medicines  • Negative: [1] Difficulty breathing AND [2] only present when coughing  • Negative: [1] Difficulty breathing AND [2] only from stuffy or runny nose  • Negative: [1] Difficulty breathing AND [2] within 14 days of COVID-19 Exposure  • Negative: [1] MODERATE difficulty breathing (e.g., speaks in phrases, SOB even at rest, pulse 100-120) AND [2] NEW-onset or WORSE than normal  • Negative: Oxygen level (e.g., pulse oximetry) 90 percent or lower  • Negative: Wheezing can be heard across the room  • Negative: Drooling or spitting out saliva (because can't swallow)  •  "Negative: History of prior \"blood clot\" in leg or lungs (i.e., deep vein thrombosis, pulmonary embolism)  • Negative: History of inherited increased risk of blood clots (e.g., Factor 5 Leiden, Anti-thrombin 3, Protein C or Protein S deficiency, Prothrombin mutation)  • Negative: Major surgery in the past month  • Negative: Hip or leg fracture (broken bone) in past month (or had cast on leg or ankle in past month)  • Negative: Illness requiring prolonged bedrest in past month (e.g., immobilization, long hospital stay)  • Negative: Long-distance travel in past month (e.g., car, bus, train, plane; with trip lasting 6 or more hours)  • Negative: Cancer treatment in past six months (or has cancer now)  • Negative: Extra heartbeats, irregular heart beating, or heart is beating very fast  (i.e., \"palpitations\")  • Negative: Fever > 103 F (39.4 C)  • Negative: [1] Fever > 101 F (38.3 C) AND [2] age > 60 years  • Negative: [1] Fever > 100.0 F (37.8 C) AND [2] bedridden (e.g., CVA, chronic illness, recovering from surgery)  • Negative: [1] Fever > 100.0 F (37.8 C) AND [2] diabetes mellitus or weak immune system (e.g., HIV positive, cancer chemo, splenectomy, organ transplant, chronic steroids)  • Negative: [1] Periods where breathing stops and then resumes normally AND [2] bedridden (e.g., CVA)  • Negative: Pregnant or postpartum (from 0 to 6 weeks after delivery)  • Negative: Patient sounds very sick or weak to the triager    Answer Assessment - Initial Assessment Questions  1. RESPIRATORY STATUS: \"Describe your breathing?\" (e.g., wheezing, shortness of breath, unable to speak, severe coughing)       Short of breath when he gets up an moves around   2. ONSET: \"When did this breathing problem begin?\"       Off and on since he had PPM placed April 20th  3. PATTERN \"Does the difficult breathing come and go, or has it been constant since it started?\"       Comes and goes when he gets up and moves around  4. SEVERITY: \"How bad is " "your breathing?\" (e.g., mild, moderate, severe)     - MILD: No SOB at rest, mild SOB with walking, speaks normally in sentences, can lie down, no retractions, pulse < 100.     - MODERATE: SOB at rest, SOB with minimal exertion and prefers to sit, cannot lie down flat, speaks in phrases, mild retractions, audible wheezing, pulse 100-120.     - SEVERE: Very SOB at rest, speaks in single words, struggling to breathe, sitting hunched forward, retractions, pulse > 120       Moderate  5. RECURRENT SYMPTOM: \"Have you had difficulty breathing before?\" If Yes, ask: \"When was the last time?\" and \"What happened that time?\"       no  6. CARDIAC HISTORY: \"Do you have any history of heart disease?\" (e.g., heart attack, angina, bypass surgery, angioplasty)       PPM placement  7. LUNG HISTORY: \"Do you have any history of lung disease?\"  (e.g., pulmonary embolus, asthma, emphysema)      no  8. CAUSE: \"What do you think is causing the breathing problem?\"       unknown  9. OTHER SYMPTOMS: \"Do you have any other symptoms? (e.g., dizziness, runny nose, cough, chest pain, fever)      A little lightheaded one time   10. O2 SATURATION MONITOR:  \"Do you use an oxygen saturation monitor (pulse oximeter) at home?\" If Yes, ask: \"What is your reading (oxygen level) today?\" \"What is your usual oxygen saturation reading?\" (e.g., 95%)        no  11. PREGNANCY: \"Is there any chance you are pregnant?\" \"When was your last menstrual period?\"        na  12. TRAVEL: \"Have you traveled out of the country in the last month?\" (e.g., travel history, exposures)        no    Protocols used: BREATHING DIFFICULTY-ADULT-AH      "

## 2023-05-15 NOTE — TELEPHONE ENCOUNTER
"SW patient wife. Wife states patient has not been doing well since pacemaker implant/ Patient gets SOA with exertion and gets fatigued easily. Wife states patient mowed and then was \"down\" for 2 days after. Patient denies any CP, swelling. F/U made  "

## 2023-05-17 NOTE — PROGRESS NOTES
[facility]  Cardiology progress Note    Patient Name: Josue Beck  : 1950    CHIEF COMPLAINT  Status post permanent pacemaker        Subjective   Subjective     HISTORY OF PRESENT ILLNESS    Josue Beck is a 73 y.o. male with history of complete heart block status post permanent pacemaker.  Doing well.  Occasional palpitations. no syncope.    REVIEW OF SYSTEMS    Constitutional:    No fever, no weight loss  Skin:     No rash  Otolaryngeal:    No difficulty swallowing  Cardiovascular: See HPI.  Pulmonary:    No cough, no sputum production    Personal History     Social History:    reports that he has quit smoking. He has never used smokeless tobacco. He reports that he does not currently use alcohol. He reports that he does not use drugs.    Home Medications:  Current Outpatient Medications on File Prior to Visit   Medication Sig   • amiodarone (PACERONE) 200 MG tablet Take 1 tablet by mouth Every 12 (Twelve) Hours for 6 days, THEN 1 tablet Daily for 30 days.   • aspirin 81 MG EC tablet Take 1 tablet by mouth Daily.   • esomeprazole (nexIUM) 20 MG capsule Take 1 capsule by mouth Every Morning Before Breakfast.   • fexofenadine (ALLEGRA) 180 MG tablet Take 1 tablet by mouth Daily.   • glucosamine-chondroitin 500-400 MG capsule capsule Take 1 capsule by mouth 2 (Two) Times a Day With Meals.   • Jardiance 10 MG tablet tablet Take 1 tablet by mouth Daily.   • lisinopril (PRINIVIL,ZESTRIL) 10 MG tablet Take 1 tablet by mouth Daily.   • metFORMIN (GLUCOPHAGE) 500 MG tablet Take 1 tablet by mouth 3 (Three) Times a Day As Needed.   • rosuvastatin (CRESTOR) 40 MG tablet Take 1 tablet by mouth every night at bedtime.   • [DISCONTINUED] metoprolol succinate XL (TOPROL-XL) 25 MG 24 hr tablet Take 0.5 tablets by mouth Every 12 (Twelve) Hours for 30 days.     No current facility-administered medications on file prior to visit.       Past Medical History:   Diagnosis Date   • Coronary artery disease    •  Hypertension        Allergies:  Allergies   Allergen Reactions   • Ciprofloxacin Rash       Objective    Objective       Vitals:   Heart Rate:  [62] 62  BP: (148)/(80) 148/80  Body mass index is 29.29 kg/m².     PHYSICAL EXAM:    General Appearance:   · well developed  · well nourished  HENT:   · oropharynx moist  · lips not cyanotic  Neck:  · thyroid not enlarged  · supple  Respiratory:  · no respiratory distress  · normal breath sounds  · no rales  Cardiovascular:  · no jugular venous distention  · regular rhythm  · apical impulse normal  · S1 normal, S2 normal  · no S3, no S4   · no murmur  · no rub, no thrill  · carotid pulses normal; no bruit  · pedal pulses normal  · lower extremity edema: none    Skin:   · warm, dry  Psychiatric:  · judgement and insight appropriate  · normal mood and affect        Result Review:  I have personally reviewed the available results from  [x]  Laboratory  [x]  EKG  [x]  Cardiology  [x]  Medications  [x]  Old records  []  Other:     Procedures  Lab Results   Component Value Date    CHOL 150 07/14/2021     Lab Results   Component Value Date    TRIG 151 (H) 07/14/2021    TRIG 146 05/25/2021     Lab Results   Component Value Date    HDL 36 (L) 07/14/2021    HDL 38 (L) 05/25/2021     Lab Results   Component Value Date    LDL 87 07/14/2021    LDL 76 05/25/2021     Lab Results   Component Value Date    VLDL 27 07/14/2021    VLDL 29 05/25/2021     Results for orders placed during the hospital encounter of 04/19/23    Adult Transthoracic Echo Limited W/ Cont if Necessary Per Protocol    Interpretation Summary  •  Left ventricular systolic function is normal. Calculated left ventricular EF = 57.2%     Impression/Plan:  1.  Complete heart block status post permanent pacemaker: Pacemaker check shows pacemaker is functioning normally.  2.  Palpitations/nonsustained VT: Increase Toprol-XL to 25 mg twice a day.  Discontinue amiodarone.  3.  Essential hypertension controlled: Continue Zestril 10  mg once a day.  Monitor lipid and hepatic profile.  4.  Hyperlipidemia: Continue Crestor 40 mg once a day.  5.  CORONARY ARTERY DISEASE stable: Continue aspirin 81 mg once a day.  No chest pain           Paul Young MD   05/18/23   13:02 EDT

## 2023-05-18 ENCOUNTER — OFFICE VISIT (OUTPATIENT)
Dept: CARDIOLOGY | Facility: CLINIC | Age: 73
End: 2023-05-18
Payer: MEDICARE

## 2023-05-18 VITALS
BODY MASS INDEX: 29.26 KG/M2 | HEART RATE: 62 BPM | HEIGHT: 72 IN | SYSTOLIC BLOOD PRESSURE: 148 MMHG | WEIGHT: 216 LBS | DIASTOLIC BLOOD PRESSURE: 80 MMHG

## 2023-05-18 DIAGNOSIS — I25.10 CORONARY ARTERY DISEASE INVOLVING NATIVE CORONARY ARTERY OF NATIVE HEART WITHOUT ANGINA PECTORIS: Primary | ICD-10-CM

## 2023-05-18 DIAGNOSIS — E78.2 HYPERLIPEMIA, MIXED: ICD-10-CM

## 2023-05-18 DIAGNOSIS — R00.2 PALPITATIONS: ICD-10-CM

## 2023-05-18 DIAGNOSIS — I10 HYPERTENSION, ESSENTIAL: ICD-10-CM

## 2023-05-18 RX ORDER — METOPROLOL SUCCINATE 25 MG/1
25 TABLET, EXTENDED RELEASE ORAL EVERY 12 HOURS SCHEDULED
Qty: 60 TABLET | Refills: 5 | Status: SHIPPED | OUTPATIENT
Start: 2023-05-18 | End: 2023-06-17

## 2024-01-25 RX ORDER — ROSUVASTATIN CALCIUM 40 MG/1
40 TABLET, COATED ORAL
Qty: 90 TABLET | Refills: 3 | Status: SHIPPED | OUTPATIENT
Start: 2024-01-25

## 2024-12-05 RX ORDER — ROSUVASTATIN CALCIUM 40 MG/1
40 TABLET, COATED ORAL
Qty: 90 TABLET | Refills: 3 | OUTPATIENT
Start: 2024-12-05

## (undated) DEVICE — DRSNG SURG AQUACEL AG/ADVNTGE 9X15CM 3.5X6IN

## (undated) DEVICE — CATH PACE PACEL BIOPOLAR HEART CRV 5F 110CM RT

## (undated) DEVICE — PENCL E/S SMOKEEVAC W/TELESCP CANN

## (undated) DEVICE — KT INTRO MINISTICK MAX W/GW NITNL/TUNG ECHO 4F 21G 7CM

## (undated) DEVICE — INTRO TEAR AWAY/LVD W/SD PRT 6F 13CM

## (undated) DEVICE — ANTIBACTERIAL UNDYED BRAIDED (POLYGLACTIN 910), SYNTHETIC ABSORBABLE SUTURE: Brand: COATED VICRYL

## (undated) DEVICE — UNDYED BRAIDED (POLYGLACTIN 910), SYNTHETIC ABSORBABLE SUTURE: Brand: COATED VICRYL

## (undated) DEVICE — SI AVANTI+ 6F STD W/GW  NO OBT: Brand: AVANTI

## (undated) DEVICE — SUT SILK 0/0 CT2 18IN C027D